# Patient Record
Sex: FEMALE | Race: WHITE | HISPANIC OR LATINO | Employment: OTHER | URBAN - METROPOLITAN AREA
[De-identification: names, ages, dates, MRNs, and addresses within clinical notes are randomized per-mention and may not be internally consistent; named-entity substitution may affect disease eponyms.]

---

## 2024-04-05 ENCOUNTER — OFFICE VISIT (OUTPATIENT)
Dept: UROGYNECOLOGY | Facility: CLINIC | Age: 74
End: 2024-04-05
Payer: MEDICARE

## 2024-04-05 VITALS — SYSTOLIC BLOOD PRESSURE: 127 MMHG | DIASTOLIC BLOOD PRESSURE: 59 MMHG | WEIGHT: 173.06 LBS

## 2024-04-05 DIAGNOSIS — N95.2 VAGINAL ATROPHY: ICD-10-CM

## 2024-04-05 DIAGNOSIS — N39.46 URINARY INCONTINENCE, MIXED: Primary | ICD-10-CM

## 2024-04-05 DIAGNOSIS — N81.4 UTEROVAGINAL PROLAPSE: ICD-10-CM

## 2024-04-05 DIAGNOSIS — K59.09 CHRONIC CONSTIPATION: ICD-10-CM

## 2024-04-05 DIAGNOSIS — N81.11 CYSTOCELE, MIDLINE: ICD-10-CM

## 2024-04-05 DIAGNOSIS — N81.6 RECTOCELE, FEMALE: ICD-10-CM

## 2024-04-05 PROCEDURE — 3074F SYST BP LT 130 MM HG: CPT | Mod: CPTII,S$GLB,, | Performed by: OBSTETRICS & GYNECOLOGY

## 2024-04-05 PROCEDURE — 99205 OFFICE O/P NEW HI 60 MIN: CPT | Mod: 25,S$GLB,, | Performed by: OBSTETRICS & GYNECOLOGY

## 2024-04-05 PROCEDURE — 1159F MED LIST DOCD IN RCRD: CPT | Mod: CPTII,S$GLB,, | Performed by: OBSTETRICS & GYNECOLOGY

## 2024-04-05 PROCEDURE — 87077 CULTURE AEROBIC IDENTIFY: CPT | Performed by: OBSTETRICS & GYNECOLOGY

## 2024-04-05 PROCEDURE — 51701 INSERT BLADDER CATHETER: CPT | Mod: S$GLB,,, | Performed by: OBSTETRICS & GYNECOLOGY

## 2024-04-05 PROCEDURE — 4010F ACE/ARB THERAPY RXD/TAKEN: CPT | Mod: CPTII,S$GLB,, | Performed by: OBSTETRICS & GYNECOLOGY

## 2024-04-05 PROCEDURE — 1160F RVW MEDS BY RX/DR IN RCRD: CPT | Mod: CPTII,S$GLB,, | Performed by: OBSTETRICS & GYNECOLOGY

## 2024-04-05 PROCEDURE — 87086 URINE CULTURE/COLONY COUNT: CPT | Performed by: OBSTETRICS & GYNECOLOGY

## 2024-04-05 PROCEDURE — 1126F AMNT PAIN NOTED NONE PRSNT: CPT | Mod: CPTII,S$GLB,, | Performed by: OBSTETRICS & GYNECOLOGY

## 2024-04-05 PROCEDURE — 87088 URINE BACTERIA CULTURE: CPT | Performed by: OBSTETRICS & GYNECOLOGY

## 2024-04-05 PROCEDURE — 99999 PR PBB SHADOW E&M-NEW PATIENT-LVL IV: CPT | Mod: PBBFAC,,, | Performed by: OBSTETRICS & GYNECOLOGY

## 2024-04-05 PROCEDURE — 87186 SC STD MICRODIL/AGAR DIL: CPT | Performed by: OBSTETRICS & GYNECOLOGY

## 2024-04-05 PROCEDURE — 3078F DIAST BP <80 MM HG: CPT | Mod: CPTII,S$GLB,, | Performed by: OBSTETRICS & GYNECOLOGY

## 2024-04-05 RX ORDER — HYDROCORTISONE 25 MG/G
CREAM TOPICAL
COMMUNITY
Start: 2023-10-26

## 2024-04-05 RX ORDER — LISINOPRIL 5 MG/1
TABLET ORAL
COMMUNITY
Start: 2023-10-11

## 2024-04-05 RX ORDER — METFORMIN HYDROCHLORIDE 500 MG/1
TABLET ORAL
COMMUNITY

## 2024-04-05 RX ORDER — ERGOCALCIFEROL 1.25 MG/1
CAPSULE ORAL
COMMUNITY

## 2024-04-05 RX ORDER — BEMPEDOIC ACID AND EZETIMIBE 180; 10 MG/1; MG/1
TABLET, FILM COATED ORAL
COMMUNITY

## 2024-04-05 RX ORDER — FLUOXETINE HYDROCHLORIDE 20 MG/1
CAPSULE ORAL
COMMUNITY

## 2024-04-05 RX ORDER — CLOTRIMAZOLE AND BETAMETHASONE DIPROPIONATE 10; .64 MG/G; MG/G
CREAM TOPICAL
COMMUNITY

## 2024-04-05 RX ORDER — ESOMEPRAZOLE MAGNESIUM 40 MG/1
CAPSULE, DELAYED RELEASE ORAL
COMMUNITY

## 2024-04-05 RX ORDER — DEXLANSOPRAZOLE 60 MG/1
CAPSULE, DELAYED RELEASE ORAL
COMMUNITY

## 2024-04-05 RX ORDER — DOCUSATE SODIUM 100 MG/1
100 CAPSULE, LIQUID FILLED ORAL 2 TIMES DAILY PRN
COMMUNITY
Start: 2024-04-04 | End: 2024-04-18

## 2024-04-05 RX ORDER — AMOXICILLIN AND CLAVULANATE POTASSIUM 875; 125 MG/1; MG/1
TABLET, FILM COATED ORAL
COMMUNITY

## 2024-04-05 RX ORDER — LANCETS 28 GAUGE
EACH MISCELLANEOUS
COMMUNITY

## 2024-04-05 RX ORDER — IBUPROFEN 600 MG/1
600 TABLET ORAL EVERY 6 HOURS PRN
COMMUNITY
Start: 2024-04-04 | End: 2024-04-19

## 2024-04-05 RX ORDER — EZETIMIBE 10 MG/1
TABLET ORAL
COMMUNITY
Start: 2024-03-06

## 2024-04-05 RX ORDER — HYDROCODONE BITARTRATE AND ACETAMINOPHEN 5; 325 MG/1; MG/1
1 TABLET ORAL EVERY 6 HOURS PRN
COMMUNITY
Start: 2024-04-04 | End: 2024-04-09

## 2024-04-05 RX ORDER — NARATRIPTAN 2.5 MG/1
TABLET ORAL
COMMUNITY

## 2024-04-05 RX ORDER — CELECOXIB 200 MG/1
CAPSULE ORAL
COMMUNITY

## 2024-04-05 RX ORDER — ECONAZOLE NITRATE 10 MG/G
CREAM TOPICAL
COMMUNITY
Start: 2023-11-29

## 2024-04-05 RX ORDER — FAMOTIDINE 20 MG/1
TABLET, FILM COATED ORAL
COMMUNITY

## 2024-04-05 RX ORDER — MECLIZINE HCL 12.5 MG 12.5 MG/1
TABLET ORAL
COMMUNITY

## 2024-04-05 RX ORDER — DUTASTERIDE 0.5 MG/1
CAPSULE, LIQUID FILLED ORAL
COMMUNITY

## 2024-04-05 RX ORDER — LEVOTHYROXINE SODIUM 75 UG/1
TABLET ORAL
COMMUNITY

## 2024-04-05 RX ORDER — FLUTICASONE PROPIONATE 50 MCG
SPRAY, SUSPENSION (ML) NASAL
COMMUNITY
Start: 2024-01-02

## 2024-04-05 RX ORDER — GABAPENTIN 100 MG/1
CAPSULE ORAL
COMMUNITY

## 2024-04-05 NOTE — PROGRESS NOTES
[facility] - UROGYNECOLOGY  [address] [address]    Elizabeth Felipe  [id number]  1950 April 7, 2024    Consulting Physician: Self, Garyreftico   GYN: Dr. Courtney  Primary MCATERINA.: Yohannes Swift MD    Chief Complaint   Patient presents with    Vaginal Prolapse     Recent hysteroscope 4/4/2024       HPI:     1)  UI:  (+) KEN < (+) UUI  X years.  (+) pads:1/day, usually minimum wetness and 1/night usually minimum wetness unless has UUI on way to BR.  Daytime frequency: Q 1 hours.  Nocturia: Yes: 3/night.   (--) dysuria,  (--) hematuria--was told has blood in urine sometimes,  (--) frequent UTIs.  (--) complete bladder emptying. Has to strain.   --cysto 4/4/2023: normal    2)  POP:  Present x year. worsening. below introitus. Saw Dr. Perry 2023. ASC vs colpocleisis was discussed. Tried pessary Fall 2023--comfortable.  Was set up with Q2-3 months pessary checks.  Couldn't remove independently. Saw Orlando 1/2024 for pessary cleaning. Became uncomfortable + bleeding after pessary was removed 2 weeks ago. Symptoms:(+)  pressure, discomfort.  (+) vaginal bleeding. (--) vaginal discharge. (--) sexually active--done with this. Partner has some health issues.  (--) h/o dyspareunia.  (+)  Vaginal dryness. Using aquaphor PRN.   (--) vaginal estrogen use.     3)  BM:  (+) constipation/straining. Takes linzess, colace, scoop of benefiber in coffee. Stool consistency on this regimen is normal.   (--) chronic diarrhea. (--) hematochezia.  (--) fecal incontinence.  (--) fecal smearing/urgency.  (--) complete evacuation.  Has to double defecate. Sometimes perianally splints. No rectal prolapse.     4)   bleeding:  --started around Dec 2023  --colonoscopy 2022, polyps per report; has GI appt (Suha Barlow in June 2024)  --11/2023 pap normal; no HPV  --had Dx LSC + H/S, D&C 4-4-2024 with Orlando:  Findings: Normal external female genitalia, patulous large cervix with stage III incomplete uterovaginal  prolapse, anterior vaginal wall defect in posterior vaginal wall defect, atrophic vaginal tissue without any lesions or ulcerates to the vaginal epithelium to suggest bleeding was from the vagina, intra-abdominal survey revealed lower pelvis without any significant adhesive disease noted, uterus was small with a posterior pedunculated fibroid small subserosal fibroid, small right ovary, unable to visualize tube on right or left side, left ovary appeared to be slightly adhesed some epiploic on the left colon, right upper quadrant was completely obliterated by omental adhesions, umbilical entry site was without any notable adhesions, left upper quadrant had omental adhesions extended beyond the floxiform ligament. Hysteroscopic findings uterus sounded to approximately 7 cm, long lower uterine and cervical segment, cervical polyp noted the just proximal to the endocervical canal, intrauterine cavity with significant adhesions like tissue unable to visualize the ostia bilaterally, fluid deficit measured 200 by the machine but there was spillage on the floor the probably accounts for at least 100 so I would calculate deficit less than 100 ml. Cystoscopic findings, bladder urothelium with chronic cystitis, with cystitis cystica, no abnormal vascular patterns, no masses/lesions in urothelial noted, no diverticula, brisk efflux of clear urine seen from bilateral ureteral orifices, large floppy bladder with the interureteric ridge right at the bladder neck. Urethra normal without any lesions. Tenaculum site hemostatic that did require cautery.   --endometrial curettings + polyp sent for path: pending    Past Medical History  Past Medical History:   Diagnosis Date    Diabetes mellitus     Hypertension    --breast CA: s/p L lumpectomy 2022 (E2 sens); s/p rad Tx; declined E2 blocker Tx; followed by Dr. Schmidt at  (oncology)  --DM2: metformin; sometimes checks BG; reports A1c 5+%; +neuropathy from spinal stenosis; no  secondary disease  --spinal stenosis: sciatica; takes gabapentin; can walk fine  --GERD  --hypothyroidism  --HA: takes excederin PRN; no amerge   --Fuchs heterochondria     2024 TTE:   The left ventricular ejection fraction is normal.   Ejection Fraction = 60-65%.   Grade I diastolic dysfunction, (abnormal relaxation pattern).   The right ventricular systolic function is normal.   Mild aortic regurgitation.   There is mild tricuspid regurgitation.   The inferior vena cava is normal in size with normal collapsibility     2024 CT angio coronary: IMPRESSION:   CORONARY CALCIUM SCORE 0.   MILD MYOCARDIAL BRIDGING OF THE LAD. NO SIGNIFICANT STENOSIS. NO PLAQUE FORMATION.     Past Surgical History  Past Surgical History:   Procedure Laterality Date    BREAST SURGERY Left     HYSTEROSCOPY      ROTATOR CUFF REPAIR Left    --L lumpectomy   --RUQ luisa  --LSC with LLQ hand port? sigmoidectomy for diverticulitis  --endometrial ablation    Hysterectomy: No    Past Ob History     x 2.  C/s x 0.    Largest infant weight: 9#7oz  unknown FAVD. yes episiotomy.      Gynecologic History  LMP: No LMP recorded. Patient is postmenopausal.  Age of menarche: 15 yo  Age of menopause: unknown due to ablation in 30s  Menstrual history: h/o menorrhagia  Pap test: 2023 normal/no HPV reflexed.  History of abnormal paps: No.  History of STIs:  No  Mammogram: Date of last: 2023.  Result: Normal  Colonoscopy: Date of last:  ([facility]).  Result:  polyps.  Repeat due:  .    DEXA:  Date of last: 2023.  Result:  osteopenia, FRAX not sig elevated.  Repeat due:  per PCP. Taking Vitamin D.     Family History  Family History   Family history unknown: Yes   --doesn't know paternal history; father was adopted  Colon CA: No  Breast CA: No  GYN CA: No   CA: No    Social History  Social History     Tobacco Use   Smoking Status Never   Smokeless Tobacco Never     Social History     Substance and Sexual Activity   Alcohol Use  Not Currently   .    Social History     Substance and Sexual Activity   Drug Use Never     The patient is  x 50 years.  Resides in [place] [id number].  Employment status: retired .     Allergies  Review of patient's allergies indicates:  No Known Allergies    Medications  Current Outpatient Medications on File Prior to Visit   Medication Sig Dispense Refill    blood sugar diagnostic Strp 1 strip by Other route.      celecoxib (CELEBREX) 200 MG capsule Take 1 capsule twice a day by oral route for 90 days.      clotrimazole-betamethasone 1-0.05% (LOTRISONE) cream       dexlansoprazole (DEXILANT) 60 mg capsule       docusate sodium (COLACE) 100 MG capsule Take 100 mg by mouth 2 (two) times daily as needed.      dutasteride (AVODART) 0.5 mg capsule       econazole nitrate 1 % cream APPLY TO AFFECTED AREA(S) TWO TIMES A DAY      ergocalciferol (ERGOCALCIFEROL) 50,000 unit Cap       esomeprazole (NEXIUM) 40 MG capsule       ezetimibe (ZETIA) 10 mg tablet       famotidine (PEPCID) 20 MG tablet       fluticasone propionate (FLONASE) 50 mcg/actuation nasal spray       gabapentin (NEURONTIN) 100 MG capsule Take 1 capsule 3 times a day by oral route as needed for 90 days.      HYDROcodone-acetaminophen (NORCO) 5-325 mg per tablet Take 1 tablet by mouth every 6 (six) hours as needed.      ibuprofen (ADVIL,MOTRIN) 600 MG tablet Take 600 mg by mouth every 6 (six) hours as needed.      lancets 28 gauge Misc FreeStyle Lancets 28 gauge      levothyroxine (SYNTHROID) 75 MCG tablet       linaCLOtide (LINZESS) 145 mcg Cap capsule       lisinopriL (PRINIVIL,ZESTRIL) 5 MG tablet       meclizine (ANTIVERT) 12.5 mg tablet       metFORMIN (GLUCOPHAGE) 500 MG tablet       naratriptan (AMERGE) 2.5 MG tablet       NEXLIZET 180-10 mg Tab       PROCTO-MED HC 2.5 % rectal cream       amoxicillin-clavulanate 875-125mg (AUGMENTIN) 875-125 mg per tablet  (Patient not taking: Reported on 4/5/2024)      FLUoxetine 20 MG capsule         No current facility-administered medications on file prior to visit.       Review of Systems A 14 point ROS was reviewed with pertinent positives as noted above in the history of present illness.      Constitutional: negative  Eyes: negative  Endocrine: negative  Gastrointestinal: negative  Cardiovascular: negative  Respiratory: negative  Allergic/Immunologic: negative  Integumentary: negative  Psychiatric: negative  Musculoskeletal: negative   Ear/Nose/Throat: negative  Neurologic: negative  Genitourinary: SEE HPI  Hematologic/Lymphatic: negative   Breast: negative    Urogynecologic Exam  BP (!) 127/59 (BP Location: Right arm, Patient Position: Sitting, BP Method: Medium (Automatic))   Wt 78.5 kg (173 lb 1 oz)     GENERAL APPEARANCE:  The patient is well-developed, well-nourished.   Neck:  Supple with no thyromegaly, no carotid bruits.  Heart:  Regular rate and rhythm, no murmurs, rubs or gallops.  Lungs:  Clear.  No CVA tenderness.  Abdomen:  Soft, nontender, nondistended, no hepatosplenomegaly.  Incisions:  LLQ mini lap, RUQ luisa well-healed; recent LSC well-healing    PELVIC:    External genitalia:  Normal Bartholins, Skenes and labia bilaterally.    Urethra:  No caruncle, diverticulum or masses.  (+) hypermobility.    Vagina:  Atrophy (+) , no bladder masses or tender, no discharge.  No obvious abrasions.   Cervix:  normal appearance  Uterus: normal size, contour, position, consistency, mobility, non-tender  Adnexa: Not palpable.    POP-Q:  Aa +2; Ba +2; C +2; Ap 0; Bp 0; D -3.  Genital hiatus 4, perineal body 3, total vaginal length 10 (poor Valsalva effort-moves more with cough, seems like may be close to UV POP if cervix on traction).    NEUROLOGIC:  Cranial nerves 2 through 12 intact.  Strength 5/5.  DTRs 2+ lower extremities.  S2 through 4 normal.  Sacral reflexes intact.    EXT: CHEATHAM, 2+ pulses bilaterally, no C/C/E    COUGH STRESS TEST:  negative  KEGEL: unable to perform    RECTAL:    External:   Normal, (--) hemorrhoids, (--) dovetailing.   Internal:   (--) tenderness, (--) masses, Normal resting tone, Normal active tone. Grossly heme NEG.     PVR: 30 mL    Impression    1. Urinary incontinence, mixed    2. Uterovaginal prolapse    3. Cystocele, midline    4. Rectocele, female    5. Vaginal atrophy    6. Chronic constipation        Initial Plan  The patient was counseled regarding these issues. The patient was given a summary sheet containing each of these issues with possible options for evaluation and management. When appropriate, we also reviewed computer-generated diagrams specific to their diagnoses..  All questions were addressed to the patient's satisfaction.    1) Stage 3-4 uterovaginal prolapse:  --discussed  --options: continue pessary use vs surgery   --if continue pessary use, use lubricants as below on regular basis      --if surgery: consider LeFort colpocleisis (closing vagina around uterus) if pathology from recent H/S D&C NEG; no longer concerned with intercourse    --pap 2023 normal; no h/o abnormal paps   --if surgery: if wants to maintain ability to have intercourse, would do robotic-hysterectomy + removal of tubes/ovaries + sacral colpopexy   --if surgery:  bladder testing (urodynamics) to see if needs sling for stress incontinence    --recent cysto 4-4-2024 with Orlando pendleton   --if surgery: need to complete evaluation to r/o GI contribution to bleeding--has GI appt June; rectal exam grossly NEG for blood today   --if surgery: follow up pathology from 4-4-2024 OR   --if surgery: clearance per PCP Deborah) + labs (A1c, TSH, CBC, CMP, T&S)/EKG    --had recent TTE/CT angio CA++, ok    2)  Chronic constipation:  --GI consult 6/2023  --continue to avoid straining with BMs  --continue linzess, colace, scoop of benefiber--take with large glass of water, not coffee  --how to slowly increase fiber:  Take 1 tsp of fiber powder (psyllium or other sugar-free powder).  Mix in 8 oz of water.  Take x  3-5 days.  Then, increase fiber by 1 tsp every 3-5 days until stool is easy to pass.  Stop and continue at that dose.   Do not exceed 6 tsps/day.  May also use over the counter stool softener 1-2 x/day.  AVOID laxatives.  --hydrate well  --consider pelvic floor PT to work on not straining as much with bowel movements    3)  Vaginal atrophy (dryness):    --use Non-estrogen options for vaginal dryness--will help pessary not rub:  --Use REPLENS or REFRESH OTC: 1/2 applicator full in vagina twice a week.    --coconut oil: dime-sized amount with finger (as far as can reach internally) and around the vaginal opening and inner lips at least 3x/week    4)  Mixed urinary incontinence, urge > stress:    --urine C&S  --Empty bladder every 3 hours.  Empty well: wait a minute, lean forward on toilet.    --Avoid dietary irritants (see sheet).  Keep diary x 3-5 days to determine your irritants.  --KEGELS: do 10 in AM and 10 in PM, holding each x 10 seconds.  When you feel urge to go, STOP, KEGEL, and when urge has passed, then go to bathroom.  Consider PT in future.    --URGE: consider medication in future. Takes 2-4 weeks to see if will have effect.  For dry mouth: get sour, sugar free lozenge or gum.    --STRESS:  Pessary vs. Sling.     5)  RTC 3-4 for pessary check with NP or PA.     Approximately 90 min were spent in consult, 90 % in discussion.   The patient's daughter was present for the entire visit.   Thank you for requesting consultation of your patient.  I look forward to participating in their care.    Zack Arellano  Female Pelvic Medicine and Reconstructive Surgery  [facility] [place], [place]

## 2024-04-05 NOTE — PATIENT INSTRUCTIONS
1) Stage 3-4 uterovaginal prolapse:  --discussed  --options: continue pessary use vs surgery   --if continue pessary use, use lubricants as below on regular basis      --if surgery: consider LeFort colpocleisis (closing vagina around uterus) if pathology from recent H/S D&C NEG; no longer concerned with intercourse    --pap 2023 normal; no h/o abnormal paps   --if surgery: if wants to maintain ability to have intercourse, would do robotic-hysterectomy + removal of tubes/ovaries + sacral colpopexy   --if surgery:  bladder testing (urodynamics) to see if needs sling for stress incontinence    --recent cysto 4-4-2024 with Orlando pendleton   --if surgery: need to complete evaluation to r/o GI contribution to bleeding--has GI appt June; rectal exam grossly NEG for blood today   --if surgery: follow up pathology from 4-4-2024 OR   --if surgery: clearance per PCP + labs (A1c, TSH, CBC, CMP, T&S)/EKG    --had recent TTE/CT angio CA++, ok    2)  Chronic constipation:  --GI consult 6/2023  --continue to avoid straining with BMs  --continue linzess, colace, scoop of benefiber--take with large glass of water, not coffee  --hydrate well    3)  Vaginal atrophy (dryness):    --use Non-estrogen options for vaginal dryness--will help pessary not rub:  --Use REPLENS or REFRESH OTC: 1/2 applicator full in vagina twice a week.    --coconut oil: dime-sized amount with finger (as far as can reach internally) and around the vaginal opening and inner lips at least 3x/week    4)  Mixed urinary incontinence, urge > stress:    --urine C&S  --Empty bladder every 3 hours.  Empty well: wait a minute, lean forward on toilet.    --Avoid dietary irritants (see sheet).  Keep diary x 3-5 days to determine your irritants.  --KEGELS: do 10 in AM and 10 in PM, holding each x 10 seconds.  When you feel urge to go, STOP, KEGEL, and when urge has passed, then go to bathroom.  Consider PT in future.    --URGE: consider medication in future. Takes 2-4 weeks to see  if will have effect.  For dry mouth: get sour, sugar free lozenge or gum.    --STRESS:  Pessary vs. Sling.     5)  RTC 3-4 for pessary check with NP or PA.

## 2024-04-07 PROBLEM — N81.6 RECTOCELE, FEMALE: Status: ACTIVE | Noted: 2024-04-07

## 2024-04-07 PROBLEM — N81.11 CYSTOCELE, MIDLINE: Status: ACTIVE | Noted: 2024-04-07

## 2024-04-07 PROBLEM — K59.09 CHRONIC CONSTIPATION: Status: ACTIVE | Noted: 2024-04-07

## 2024-04-07 PROBLEM — N81.4 UTEROVAGINAL PROLAPSE: Status: ACTIVE | Noted: 2024-04-07

## 2024-04-07 PROBLEM — N39.46 URINARY INCONTINENCE, MIXED: Status: ACTIVE | Noted: 2024-04-07

## 2024-04-07 PROBLEM — N95.2 VAGINAL ATROPHY: Status: ACTIVE | Noted: 2024-04-07

## 2024-04-11 ENCOUNTER — PATIENT MESSAGE (OUTPATIENT)
Dept: UROGYNECOLOGY | Facility: CLINIC | Age: 74
End: 2024-04-11
Payer: MEDICARE

## 2024-04-11 DIAGNOSIS — N39.0 ACUTE UTI: Primary | ICD-10-CM

## 2024-04-11 DIAGNOSIS — R19.8 STRAINING WITH STOOLS: Primary | ICD-10-CM

## 2024-04-11 DIAGNOSIS — N39.46 URINARY INCONTINENCE, MIXED: ICD-10-CM

## 2024-04-11 LAB — BACTERIA UR CULT: ABNORMAL

## 2024-04-11 RX ORDER — CIPROFLOXACIN 500 MG/1
500 TABLET ORAL 2 TIMES DAILY
Qty: 6 TABLET | Refills: 0 | Status: SHIPPED | OUTPATIENT
Start: 2024-04-11 | End: 2024-04-14

## 2024-04-12 DIAGNOSIS — N39.46 URINARY INCONTINENCE, MIXED: Primary | ICD-10-CM

## 2024-04-12 DIAGNOSIS — R19.8 STRAINING WITH STOOLS: ICD-10-CM

## 2024-04-25 ENCOUNTER — TELEPHONE (OUTPATIENT)
Dept: UROGYNECOLOGY | Facility: CLINIC | Age: 74
End: 2024-04-25
Payer: MEDICARE

## 2024-04-25 NOTE — TELEPHONE ENCOUNTER
Spoke with patient. She states Dr. Perry told her pathology was benign.  UNable to see copy. Advised to get copy and upload into [facility] if possible.    ----- Message from Zack Arellano MD sent at 4/7/2024  6:02 PM CDT -----  Regarding: follow up D&C path from [facility] 4-4

## 2024-04-26 NOTE — TELEPHONE ENCOUNTER
Pathology from [facility] [place] 4/[place]24 was benign. Report attached to patient message from 4/25/2024.

## 2024-05-15 ENCOUNTER — OFFICE VISIT (OUTPATIENT)
Dept: UROGYNECOLOGY | Facility: CLINIC | Age: 74
End: 2024-05-15
Payer: MEDICARE

## 2024-05-15 DIAGNOSIS — N81.6 RECTOCELE, FEMALE: ICD-10-CM

## 2024-05-15 DIAGNOSIS — K59.09 CHRONIC CONSTIPATION: ICD-10-CM

## 2024-05-15 DIAGNOSIS — N81.11 CYSTOCELE, MIDLINE: ICD-10-CM

## 2024-05-15 DIAGNOSIS — N81.4 UTEROVAGINAL PROLAPSE: ICD-10-CM

## 2024-05-15 DIAGNOSIS — N95.2 VAGINAL ATROPHY: ICD-10-CM

## 2024-05-15 DIAGNOSIS — N39.46 URINARY INCONTINENCE, MIXED: Primary | ICD-10-CM

## 2024-05-15 DIAGNOSIS — Z46.89 PESSARY MAINTENANCE: ICD-10-CM

## 2024-05-15 PROCEDURE — 1159F MED LIST DOCD IN RCRD: CPT | Mod: CPTII,S$GLB,, | Performed by: NURSE PRACTITIONER

## 2024-05-15 PROCEDURE — 1160F RVW MEDS BY RX/DR IN RCRD: CPT | Mod: CPTII,S$GLB,, | Performed by: NURSE PRACTITIONER

## 2024-05-15 PROCEDURE — 4010F ACE/ARB THERAPY RXD/TAKEN: CPT | Mod: CPTII,S$GLB,, | Performed by: NURSE PRACTITIONER

## 2024-05-15 PROCEDURE — 99999 PR PBB SHADOW E&M-EST. PATIENT-LVL III: CPT | Mod: PBBFAC,,, | Performed by: NURSE PRACTITIONER

## 2024-05-15 PROCEDURE — 99213 OFFICE O/P EST LOW 20 MIN: CPT | Mod: S$GLB,,, | Performed by: NURSE PRACTITIONER

## 2024-05-15 PROCEDURE — 3288F FALL RISK ASSESSMENT DOCD: CPT | Mod: CPTII,S$GLB,, | Performed by: NURSE PRACTITIONER

## 2024-05-15 PROCEDURE — 1101F PT FALLS ASSESS-DOCD LE1/YR: CPT | Mod: CPTII,S$GLB,, | Performed by: NURSE PRACTITIONER

## 2024-05-15 NOTE — PROGRESS NOTES
Urogyn follow up  05/15/2024  .  [facility] [place]  [address]  [facility] [id number]    Elizabeth Felipe  [id number]  1950      Elizabeth Felipe is a 74 y.o. here for a urogyn follow up for pessary check/.    Last HPI from 04/05/2024  1)  UI:  (+) KEN < (+) UUI  X years.  (+) pads:1/day, usually minimum wetness and 1/night usually minimum wetness unless has UUI on way to BR.  Daytime frequency: Q 1 hours.  Nocturia: Yes: 3/night.   (--) dysuria,  (--) hematuria--was told has blood in urine sometimes,  (--) frequent UTIs.  (--) complete bladder emptying. Has to strain.   --cysto 4/4/2023: normal     2)  POP:  Present x year. worsening. below introitus. Saw Dr. Perry 2023. ASC vs colpocleisis was discussed. Tried pessary Fall 2023--comfortable.  Was set up with Q2-3 months pessary checks.  Couldn't remove independently. Saw Orlando 1/2024 for pessary cleaning. Became uncomfortable + bleeding after pessary was removed 2 weeks ago. Symptoms:(+)  pressure, discomfort.  (+) vaginal bleeding. (--) vaginal discharge. (--) sexually active--done with this. Partner has some health issues.  (--) h/o dyspareunia.  (+)  Vaginal dryness. Using aquaphor PRN.   (--) vaginal estrogen use.   POP-Q:  Aa +2; Ba +2; C +2; Ap 0; Bp 0; D -3.  Genital hiatus 4, perineal body 3, total vaginal length 10 (poor Valsalva effort-moves more with cough, seems like may be close to UV POP if cervix on traction).   Pvr 30 mL     3)  BM:  (+) constipation/straining. Takes linzess, colace, scoop of benefiber in coffee. Stool consistency on this regimen is normal.   (--) chronic diarrhea. (--) hematochezia.  (--) fecal incontinence.  (--) fecal smearing/urgency.  (--) complete evacuation.  Has to double defecate. Sometimes perianally splints. No rectal prolapse.      4)   bleeding:  --started around Dec 2023  --colonoscopy 2022, polyps per report; has GI appt (Suha Barlow in June 2024)  --11/2023 pap normal; no HPV  --had Dx LSC + H/S,  D&C 4-4-2024 with Orlando:  Findings: Normal external female genitalia, patulous large cervix with stage III incomplete uterovaginal prolapse, anterior vaginal wall defect in posterior vaginal wall defect, atrophic vaginal tissue without any lesions or ulcerates to the vaginal epithelium to suggest bleeding was from the vagina, intra-abdominal survey revealed lower pelvis without any significant adhesive disease noted, uterus was small with a posterior pedunculated fibroid small subserosal fibroid, small right ovary, unable to visualize tube on right or left side, left ovary appeared to be slightly adhesed some epiploic on the left colon, right upper quadrant was completely obliterated by omental adhesions, umbilical entry site was without any notable adhesions, left upper quadrant had omental adhesions extended beyond the floxiform ligament. Hysteroscopic findings uterus sounded to approximately 7 cm, long lower uterine and cervical segment, cervical polyp noted the just proximal to the endocervical canal, intrauterine cavity with significant adhesions like tissue unable to visualize the ostia bilaterally, fluid deficit measured 200 by the machine but there was spillage on the floor the probably accounts for at least 100 so I would calculate deficit less than 100 ml. Cystoscopic findings, bladder urothelium with chronic cystitis, with cystitis cystica, no abnormal vascular patterns, no masses/lesions in urothelial noted, no diverticula, brisk efflux of clear urine seen from bilateral ureteral orifices, large floppy bladder with the interureteric ridge right at the bladder neck. Urethra normal without any lesions. Tenaculum site hemostatic that did require cautery.   --endometrial curettings + polyp sent for path: pending       Changes from last visit:  1) Stage 3-4 uterovaginal prolapse:  --using GH pessary     2)  Chronic constipation:  --taking linzess occasionally  --using fiber supplement daily  --going to  pelvic floor PT   --BM has improved with pessary in place     3)  Vaginal atrophy (dryness):    --not using vaginal lubrication     4)  Mixed urinary incontinence, urge > stress:    --+KEN/UUI  --using 1/ pad/ day with minimal wetness  --voiding 1 1/2-2 hours  --nocturia 3-5/ night--limits fluids prior to bedtime.      Past Medical History:   Diagnosis Date    Diabetes mellitus     Hypertension    --breast CA: s/p L lumpectomy 2022 (E2 sens); s/p rad Tx; declined E2 blocker Tx; followed by Dr. Schmidt at  (oncology)  --DM2: metformin; sometimes checks BG; reports A1c 5+%; +neuropathy from spinal stenosis; no secondary disease  --spinal stenosis: sciatica; takes gabapentin; can walk fine  --GERD  --hypothyroidism  --HA: takes excederin PRN; no amerge   --Fuchs heterochondria      2/2024 TTE:   The left ventricular ejection fraction is normal.   Ejection Fraction = 60-65%.   Grade I diastolic dysfunction, (abnormal relaxation pattern).   The right ventricular systolic function is normal.   Mild aortic regurgitation.   There is mild tricuspid regurgitation.   The inferior vena cava is normal in size with normal collapsibility      2/2024 CT angio coronary: IMPRESSION:   CORONARY CALCIUM SCORE 0.   MILD MYOCARDIAL BRIDGING OF THE LAD. NO SIGNIFICANT STENOSIS. NO PLAQUE FORMATION.        Past Surgical History:   Procedure Laterality Date    BREAST SURGERY Left 2021    HYSTEROSCOPY      ROTATOR CUFF REPAIR Left        Family History   Family history unknown: Yes       Social History     Socioeconomic History    Marital status:    Tobacco Use    Smoking status: Never    Smokeless tobacco: Never   Substance and Sexual Activity    Alcohol use: Not Currently    Drug use: Never    Sexual activity: Not Currently     Social Determinants of Health     Financial Resource Strain: Low Risk  (5/14/2024)    Overall Financial Resource Strain (CARDIA)     Difficulty of Paying Living Expenses: Not hard at all   Food Insecurity:  No Food Insecurity (5/14/2024)    Hunger Vital Sign     Worried About Running Out of Food in the Last Year: Never true     Ran Out of Food in the Last Year: Never true   Transportation Needs: No Transportation Needs (5/14/2024)    PRAPARE - Transportation     Lack of Transportation (Medical): No     Lack of Transportation (Non-Medical): No   Physical Activity: Unknown (5/14/2024)    Exercise Vital Sign     Days of Exercise per Week: 0 days   Recent Concern: Physical Activity - Inactive (4/2/2024)    Received from [facility], [facility]    Exercise Vital Sign     Days of Exercise per Week: 0 days     Minutes of Exercise per Session: 0 min   Stress: Stress Concern Present (5/14/2024)    [employer] - Occupational Stress Questionnaire     Feeling of Stress : To some extent   Housing Stability: Unknown (5/14/2024)    Housing Stability Vital Sign     Unable to Pay for Housing in the Last Year: No       Current Outpatient Medications   Medication Sig Dispense Refill    blood sugar diagnostic Strp 1 strip by Other route.      celecoxib (CELEBREX) 200 MG capsule Take 1 capsule twice a day by oral route for 90 days.      clotrimazole-betamethasone 1-0.05% (LOTRISONE) cream       dutasteride (AVODART) 0.5 mg capsule       ezetimibe (ZETIA) 10 mg tablet       famotidine (PEPCID) 20 MG tablet       fluticasone propionate (FLONASE) 50 mcg/actuation nasal spray       gabapentin (NEURONTIN) 100 MG capsule Take 1 capsule 3 times a day by oral route as needed for 90 days.      lancets 28 gauge Misc FreeStyle Lancets 28 gauge      levothyroxine (SYNTHROID) 75 MCG tablet       linaCLOtide (LINZESS) 145 mcg Cap capsule       lisinopriL (PRINIVIL,ZESTRIL) 5 MG tablet       meclizine (ANTIVERT) 12.5 mg tablet       metFORMIN (GLUCOPHAGE) 500 MG tablet       naratriptan (AMERGE) 2.5 MG tablet       NEXLIZET 180-10 mg Tab       amoxicillin-clavulanate 875-125mg (AUGMENTIN) 875-125 mg per tablet  (Patient not  taking: Reported on 4/5/2024)      dexlansoprazole (DEXILANT) 60 mg capsule       econazole nitrate 1 % cream APPLY TO AFFECTED AREA(S) TWO TIMES A DAY      ergocalciferol (ERGOCALCIFEROL) 50,000 unit Cap       esomeprazole (NEXIUM) 40 MG capsule       FLUoxetine 20 MG capsule       PROCTO-MED HC 2.5 % rectal cream        No current facility-administered medications for this visit.       Review of patient's allergies indicates:  No Known Allergies    Well woman:  Pap test: 11/2023 normal/no HPV reflexed.  History of abnormal paps: No.  History of STIs:  No  Mammogram: Date of last: 9/2023.  Result: Normal  Colonoscopy: Date of last: 2022 ([facility]).  Result:  polyps.  Repeat due:  2027.    DEXA:  Date of last: 6/2023.  Result:  osteopenia, FRAX not sig elevated.  Repeat due:  per PCP. Taking Vitamin D.     ROS:  As per HPI.      Exam  There were no vitals taken for this visit.  General: alert and oriented, no acute distress  Respiratory: normal respiratory effort  Abd: soft, non-tender, non-distended    Pelvic  Ext. Genitalia: normal external genitalia. Normal bartholin's and skeens glands  Vagina: + atrophy. Normal vaginal mucosa without lesions. No discharge noted.   Non-tender bladder base without palpable mass.  GH  LS pessary in place  Cervix: no lesions  Uterus: normal size, shape, position, mobile, nontender  Urethra: no masses or tenderness  Urethral meatus: no lesions, caruncle or prolapse.    Pessary removed without difficulty. Washed with soap and water. Reinserted without diffiuclty    Impression  1. Urinary incontinence, mixed        2. Uterovaginal prolapse        3. Cystocele, midline        4. Rectocele, female        5. Vaginal atrophy        6. Chronic constipation        7. Pessary maintenance          We reviewed the above issues and discussed options for short-term versus long-term management of her problems.   Plan:   1) Stage 3-4 uterovaginal prolapse:  --continue GH pessary  --options:  continue pessary use vs surgery              --if continue pessary use, use lubricants as below on regular basis                            --if surgery: consider LeFort colpocleisis (closing vagina around uterus) if pathology from recent H/S D&C NEG; no longer concerned with intercourse                          --pap 2023 normal; no h/o abnormal paps              --if surgery: if wants to maintain ability to have intercourse, would do robotic-hysterectomy + removal of tubes/ovaries + sacral colpopexy              --if surgery:  bladder testing (urodynamics) to see if needs sling for stress incontinence                          --recent cysto 4-4-2024 with Orlando pendleton              --if surgery: need to complete evaluation to r/o GI contribution to bleeding--has GI appt June; rectal exam grossly NEG for blood today              --if surgery: follow up pathology from 4-4-2024 OR              --if surgery: clearance per PCP (Jamison) + labs (A1c, TSH, CBC, CMP, T&S)/EKG                          --had recent TTE/CT angio CA++, ok     2)  Chronic constipation:  --GI consult 6/2023  --continue to avoid straining with BMs  --continue linzess, colace, scoop of benefiber--take with large glass of water, not coffee  --how to slowly increase fiber:  Take 1 tsp of fiber powder (psyllium or other sugar-free powder).  Mix in 8 oz of water.  Take x 3-5 days.  Then, increase fiber by 1 tsp every 3-5 days until stool is easy to pass.  Stop and continue at that dose.   Do not exceed 6 tsps/day.  May also use over the counter stool softener 1-2 x/day.  AVOID laxatives.  --hydrate well  --consider pelvic floor PT to work on not straining as much with bowel movements     3)  Vaginal atrophy (dryness):    --use Non-estrogen options for vaginal dryness--will help pessary not rub:  --Use REPLENS or REFRESH OTC: 1/2 applicator full in vagina twice a week.    --coconut oil: dime-sized amount with finger (as far as can reach internally) and  around the vaginal opening and inner lips at least 3x/week     4)  Mixed urinary incontinence, urge > stress:    --Empty bladder every 3 hours.  Empty well: wait a minute, lean forward on toilet.    --Avoid dietary irritants (see sheet).  Keep diary x 3-5 days to determine your irritants.  --KEGELS: do 10 in AM and 10 in PM, holding each x 10 seconds.  When you feel urge to go, STOP, KEGEL, and when urge has passed, then go to bathroom.  Consider PT in future.    --URGE: consider medication in future. Takes 2-4 weeks to see if will have effect.  For dry mouth: get sour, sugar free lozenge or gum.  Does not want to try medications at this time  --STRESS:  Pessary vs. Sling.      5)  RTC 3-4 for pessary check     I spent a total of 20 minutes on the day of the visit.  This includes face to face time and non-face to face time preparing to see the patient (eg, review of tests), obtaining and/or reviewing separately obtained history, documenting clinical information in the electronic or other health record, independently interpreting results and communicating results to the patient/family/caregiver, or care coordinator.       Aleida Friedman, EMILE-BC  [facility]  Division of Female Pelvic Medicine and Reconstructive Surgery  Department of Obstetrics & Gynecology

## 2024-05-15 NOTE — PATIENT INSTRUCTIONS
1) Stage 3-4 uterovaginal prolapse:  --continue GH pessary  --options: continue pessary use vs surgery              --if continue pessary use, use lubricants as below on regular basis                            --if surgery: consider LeFort colpocleisis (closing vagina around uterus) if pathology from recent H/S D&C NEG; no longer concerned with intercourse                          --pap 2023 normal; no h/o abnormal paps              --if surgery: if wants to maintain ability to have intercourse, would do robotic-hysterectomy + removal of tubes/ovaries + sacral colpopexy              --if surgery:  bladder testing (urodynamics) to see if needs sling for stress incontinence                          --recent cysto 4-4-2024 with Orlando pendleton              --if surgery: need to complete evaluation to r/o GI contribution to bleeding--has GI appt June; rectal exam grossly NEG for blood today              --if surgery: follow up pathology from 4-4-2024 OR              --if surgery: clearance per PCP (Jamison) + labs (A1c, TSH, CBC, CMP, T&S)/EKG                          --had recent TTE/CT angio CA++, ok     2)  Chronic constipation:  --GI consult 6/2023  --continue to avoid straining with BMs  --continue linzess, colace, scoop of benefiber--take with large glass of water, not coffee  --how to slowly increase fiber:  Take 1 tsp of fiber powder (psyllium or other sugar-free powder).  Mix in 8 oz of water.  Take x 3-5 days.  Then, increase fiber by 1 tsp every 3-5 days until stool is easy to pass.  Stop and continue at that dose.   Do not exceed 6 tsps/day.  May also use over the counter stool softener 1-2 x/day.  AVOID laxatives.  --hydrate well  --consider pelvic floor PT to work on not straining as much with bowel movements     3)  Vaginal atrophy (dryness):    --use Non-estrogen options for vaginal dryness--will help pessary not rub:  --Use REPLENS or REFRESH OTC: 1/2 applicator full in vagina twice a week.    --coconut oil:  dime-sized amount with finger (as far as can reach internally) and around the vaginal opening and inner lips at least 3x/week     4)  Mixed urinary incontinence, urge > stress:    --Empty bladder every 3 hours.  Empty well: wait a minute, lean forward on toilet.    --Avoid dietary irritants (see sheet).  Keep diary x 3-5 days to determine your irritants.  --KEGELS: do 10 in AM and 10 in PM, holding each x 10 seconds.  When you feel urge to go, STOP, KEGEL, and when urge has passed, then go to bathroom.  Consider PT in future.    --URGE: consider medication in future. Takes 2-4 weeks to see if will have effect.  For dry mouth: get sour, sugar free lozenge or gum.  Does not want to try medications at this time  --STRESS:  Pessary vs. Sling.      5)  RTC 3-4 for pessary check

## 2024-06-26 NOTE — PROGRESS NOTES
"    [facility] Consultation Note    Reason for Consult:  reflux and IBS    PCP:   Yohannes Swift   No address on file    Referring MD:  Self, Aaareferral  No address on file    HPI:  This is a 74 y.o. female here for evaluation of reflux and IBS. PMHx of HTN, Breast Cancer, DM. Previously followed with A. Has a history of alternating bowel habits and reflux. Was diagnosed with IBS in the past. Currently on Omeprazole 20mg in AM and two pepcids at night. Still having reflux symptoms despite this. She has pyrosis. Denied epigastric pain, N/V, dysphagia. Has some chronic cough and hoarseness of throat. She had CCY in 20s for cholelithiasis. She has her last endoscopy reports from [facility] with her. 1/2023 cscope removed 10mm sigmoid polyp and showed left sided diverticulosis. EGD showed 6/2023 gastritis and had biopsies of stomach, SB and esophagus which she was told was normal. She also had colon resection in 2005 for recurrant diverticulitis in sigmoid.      Predominately constipated in last 6 months. Has soft consistency of stools which are formed but they are hard to pass. She strains and also has to do manual maneuvers with gloves or use a suppository. No blood in stool. Previously more issues with diarrhea. When she is having diarrhea she will have some small episodes of fecal incontinence of liquid stool. She also has urinary incontinence. Does pelvic floor PT as referral from urogyn. Wears a pad. Has a pessery. Has 2 vaginal deliveries. Denied NSAID use. No Fh of CRC, gastric cancer, celiac or IBD. She has used linzess 145mcg in the past and this does work for her. Used to work immediately and now works later in the day. Also takes benefiber in the AM one dose and a stool softener.     Objective Findings:    Vital Signs:  /73   Pulse 66   Ht 5' 5" (1.651 m)   Wt 81.3 kg (179 lb 3.7 oz)   BMI 29.83 kg/m²   Body mass index is 29.83 kg/m².    Physical Exam:  General Appearance: Well " appearing in no acute distress  Head:   Normocephalic, without obvious abnormality  Eyes:    No scleral icterus    Lungs: CTA bilaterally in anterior and posterior fields   Heart:  Regular rate and rhythm, no murmurs heard  Abdomen: Soft, non tender, non distended with positive bowel sounds in all four quadrants.      Imaging:  Reviewed     Endoscopy:    None     Assessment:    Reflux   Alternating bowel habits, predominately constipation      Patient with reflux symptoms despite PPI and H2 blocker use. EGD in 2023 showed gastritis but otherwise normal. Her heme/onc doctor had concerns her PPI was causing low WBC count and was recommended to decrease or stop this. Will repeat CBC today. Add gaviscon and see GI nutrition for reflux. Further options include EGD or EGD with bravo. Could also empirically try nortriptyline as recent EGD was unremarkable at OSH. For constipation, her stools are soft but sounds like most issue is with evacuation of stool. Will check ARM. Continue linzess, increase fiber. She is UTD on cscope   Recommendations:    - CBC, CMP   - Add gavison  - Take PPI on empty stomach, 30 mins before meals   - H2 blocker for breakthrough symptoms   - GERD precautions discussed   - see GI nutrition for reflux   - Continue linzess, increase fiber   - Refer for ARM   - Cscope in 2026 for CRC surveillance     RTC 3 months       Order summary:  Orders Placed This Encounter    CBC W/ AUTO DIFFERENTIAL    COMPREHENSIVE METABOLIC PANEL    linaCLOtide (LINZESS) 145 mcg Cap capsule         Thank you so much for allowing me to participate in the care of Elizabeth Barlow MD  Gastroenterology   [facility]

## 2024-06-27 ENCOUNTER — OFFICE VISIT (OUTPATIENT)
Dept: GASTROENTEROLOGY | Facility: CLINIC | Age: 74
End: 2024-06-27
Payer: MEDICARE

## 2024-06-27 VITALS
DIASTOLIC BLOOD PRESSURE: 73 MMHG | WEIGHT: 179.25 LBS | HEART RATE: 66 BPM | BODY MASS INDEX: 29.87 KG/M2 | HEIGHT: 65 IN | SYSTOLIC BLOOD PRESSURE: 122 MMHG

## 2024-06-27 DIAGNOSIS — K59.00 CONSTIPATION, UNSPECIFIED CONSTIPATION TYPE: Primary | ICD-10-CM

## 2024-06-27 PROCEDURE — 1125F AMNT PAIN NOTED PAIN PRSNT: CPT | Mod: CPTII,S$GLB,, | Performed by: STUDENT IN AN ORGANIZED HEALTH CARE EDUCATION/TRAINING PROGRAM

## 2024-06-27 PROCEDURE — 3288F FALL RISK ASSESSMENT DOCD: CPT | Mod: CPTII,S$GLB,, | Performed by: STUDENT IN AN ORGANIZED HEALTH CARE EDUCATION/TRAINING PROGRAM

## 2024-06-27 PROCEDURE — 3078F DIAST BP <80 MM HG: CPT | Mod: CPTII,S$GLB,, | Performed by: STUDENT IN AN ORGANIZED HEALTH CARE EDUCATION/TRAINING PROGRAM

## 2024-06-27 PROCEDURE — 1159F MED LIST DOCD IN RCRD: CPT | Mod: CPTII,S$GLB,, | Performed by: STUDENT IN AN ORGANIZED HEALTH CARE EDUCATION/TRAINING PROGRAM

## 2024-06-27 PROCEDURE — 1101F PT FALLS ASSESS-DOCD LE1/YR: CPT | Mod: CPTII,S$GLB,, | Performed by: STUDENT IN AN ORGANIZED HEALTH CARE EDUCATION/TRAINING PROGRAM

## 2024-06-27 PROCEDURE — 3074F SYST BP LT 130 MM HG: CPT | Mod: CPTII,S$GLB,, | Performed by: STUDENT IN AN ORGANIZED HEALTH CARE EDUCATION/TRAINING PROGRAM

## 2024-06-27 PROCEDURE — 99205 OFFICE O/P NEW HI 60 MIN: CPT | Mod: S$GLB,,, | Performed by: STUDENT IN AN ORGANIZED HEALTH CARE EDUCATION/TRAINING PROGRAM

## 2024-06-27 PROCEDURE — 99999 PR PBB SHADOW E&M-EST. PATIENT-LVL IV: CPT | Mod: PBBFAC,,, | Performed by: STUDENT IN AN ORGANIZED HEALTH CARE EDUCATION/TRAINING PROGRAM

## 2024-06-27 PROCEDURE — 4010F ACE/ARB THERAPY RXD/TAKEN: CPT | Mod: CPTII,S$GLB,, | Performed by: STUDENT IN AN ORGANIZED HEALTH CARE EDUCATION/TRAINING PROGRAM

## 2024-06-27 PROCEDURE — 3008F BODY MASS INDEX DOCD: CPT | Mod: CPTII,S$GLB,, | Performed by: STUDENT IN AN ORGANIZED HEALTH CARE EDUCATION/TRAINING PROGRAM

## 2024-06-27 NOTE — PATIENT INSTRUCTIONS
Gaviscon with alginate (extra strength)     Recommend to take omeprazole on an empty stomach, 45 mins before meals     Avoid eating 3-4 hours before bed     Coffee, caffeine, chocolate, peppermint and alcohol are common reflux triggers

## 2024-06-29 ENCOUNTER — PATIENT MESSAGE (OUTPATIENT)
Dept: GASTROENTEROLOGY | Facility: CLINIC | Age: 74
End: 2024-06-29
Payer: MEDICARE

## 2024-07-03 ENCOUNTER — NUTRITION (OUTPATIENT)
Dept: GASTROENTEROLOGY | Facility: CLINIC | Age: 74
End: 2024-07-03
Payer: MEDICARE

## 2024-07-03 VITALS — WEIGHT: 182.13 LBS | BODY MASS INDEX: 30.3 KG/M2

## 2024-07-03 DIAGNOSIS — K59.09 CHRONIC CONSTIPATION: Primary | ICD-10-CM

## 2024-07-03 DIAGNOSIS — K21.9 GASTROESOPHAGEAL REFLUX DISEASE, UNSPECIFIED WHETHER ESOPHAGITIS PRESENT: ICD-10-CM

## 2024-07-03 PROCEDURE — 99999 PR PBB SHADOW E&M-EST. PATIENT-LVL I: CPT | Mod: PBBFAC,,,

## 2024-07-03 NOTE — PROGRESS NOTES
"Gastroentrology  Nutrition Consult    Referring Provider: Carmen Barlow MD  Reason for Nutrition Referral: GERD with cough and hoarseness ; s/p Breast cancer treatment ( lumpectomy and radiation ) Hem-onc  has recommended elimination of PPI due to decreased WBC  Hx of sigmoid bowel resection due to diverticulitis ; Spinal stenosis /sciatica- takes gabapentin   Contributing factors- Dm on metformin ; hypothyroidism   Consultation Time: 60 Minutes     Elizabeth Felipe is a 74 y.o. female referred with the following symptoms : GERD - exploring non PPI due to Hem-onc's concern about decreased WBC count . Hx of anxiety exacerbated by 's recent health changes and son's unexpected death which has impacted management of GERD.  Noting PPI and H2 blocker currently with gaviscon added.  used to do cooking ( heavy meals with high fat and cho) ; Patient has assumed cooking responsibilities with lower fat and spice level. Triggers for Reflux more situational than food . Had been prescribed Prozac in past but decreased focus . Cymbalta maintained focus but no effect on anxiety . Needs to be alert and available at night with 's recent health issues.     A = NUTRITION ASSESSMENT   Patient Information:    Elizabeth Felipe  74 y.o.-year-old  White female    Social Data: lives with  and adult daughter; patient prepares meals and does shopping since husbands recent car accident.   .  Anthropometrics:   Relevant Wt hx: notes unintentional weight loss from 210# > 182#   since death of son     Usual Weight: 210#     Body mass index is 30.3 kg/m².  Estimated body mass index is 30.3 kg/m² as calculated from the following:    Height as of 6/27/24: 5' 5" (1.651 m).    Weight as of this encounter: 82.6 kg (182 lb 1.6 oz).  Ht Readings from Last 1 Encounters:   06/27/24 5' 5" (1.651 m)     Wt Readings from Last 12 Encounters:   07/03/24 82.6 kg (182 lb 1.6 oz)   06/27/24 81.3 kg (179 lb 3.7 oz)   04/05/24 78.5 kg (173 lb " 1 oz)   ]  BMI: Body mass index is 30.3 kg/m².   IBW: 75 kg (110% IBW)  Estimated Energy/Fluid Requirements:   Weight used: AIBW  75  kg  Calories:  8882-5197  kcal/day (20-21 kcal/kg REE)  Protein:  75  g/day (1 g/kg DRI)  Fluid:  1800  mL/day or  60  oz/day    Supplements/Vitamins:    MVI/Supp: yes  and None Noted   Activity Level:     Low Active      Form of Activity: walking    Allergies/Intolerances:     No known food allergies     Food/Nutrition-related hx:   Feels she resorts to emotional eating but notes overall decrease in weight now that she has assumed shopping and cooking responsibilities; fast food - 5 guys with  but lately avoids and cooks at home .  Appetite: Fair, consumes snacks when stressed - ice cream, cookies, chips.    Fluid Intake: Adequate[- water, almond milk , coffee in am   Diet Recall:  Breakfast: 1/2 bagel and coffee with almond milk  Lunch: sandwich with turkey or cheese on multigrain bread with corado and spinach or salad with lettuce, onion, canned beans, cherry tomatoes, leftover broccoli or cauliflower ( cooked or raw -) salad dressing - italian or ranch   Dinner: baked chicken /red beans /rice with sausage or tasso   Snacks: nectarines, chips, cookies (  used to buy these and other sweets ) , ice cream   ONS:none    Dietary preferences:   Vegetables: Likes a variety. Eats almost daily.  Fruits: Likes a variety. Eats daily.  Fluid Intake/Beverages:  water, coffee , almond milk   Dining out: Reduced lately. Mostly fast food.  Cooking at home: Daily. Mostly baked and crock pot   meat, starchy CHO, and vegetables.  Grocery Shopping and food prep done by patient .       Medical Hx  Past Medical History:   Diagnosis Date    Diabetes mellitus     Hypertension       Past Surgical History:   Procedure Laterality Date    BREAST SURGERY Left 2021    HYSTEROSCOPY      ROTATOR CUFF REPAIR Left         Labs:   Lab Results   Component Value Date     (H) 03/20/2024    K 4.7  "03/20/2024    ALBUMIN 4.8 03/20/2024    CALCIUM 10.3 03/20/2024     No results found for: "CXMJYXLW09UE", "WXNHUMZE32"  No results found for: "HGB", "HCT", "MCV"  Lab Results   Component Value Date    CREATININE 0.67 03/20/2024    ALBUMIN 4.8 03/20/2024 7/25/23  [facility]   WBC - [facility] 4.5 - 11.0 Thousand/uL 4.1 Low    RBC - [facility] 4.20 - 5.40 Million/uL 4.17 Low    HGB - [facility] 12.0 - 16.0 gm/dL 13.6   HCT - [facility] 37.0 - 47.0 % 39.7   MCV - [facility] 81.0 - 99.0 fL 95.3   MCH - [facility] 27.0 - 33.0 pg 32.5   MCHC - [facility] 32.0 - 36.0 g/dL 34.1   RDW - [facility] 12.0 - 15.3 % 13.6   Platelet Count, Automated - [facility] 150 - 350 Thousand/uL 210   Mean Platelet Volume - [facility] 7.0 - 10.2 fL 10.3 High      No results found for: "CRP"  No components found for: "FROLATE"  No results found for: "IRON"     D = NUTRITION DIAGNOSIS    PES Statement:   Primary Problem: Reflux  related to emotional /behavioral impacts on acceptance of foods  as evidenced by diet recall.      Secondary Problem: Altered GI function  related to chronic constipation  as evidenced by dependence on stool softener, Linzess and docusate sodium to facilitate bowel movement   A=ASSESSMENT Elizabeth Felipe is a 74 y.o. female  has been dealing with GI issues for years,  but has noted increasing constipation since starting gabapentin .    Patient has experienced weight has decreased 30# pounds over last 2 years   from usual body weight  of  210# to 180#  2/2 .PO intake <50% of estimated energy needs per diet recall/diet log. change in bowel habits, constipation, or need for manual assistance  nausea, pyrosis (heartburn), GERD, and constipation. Reviewed pertinent lab values decreased WBC 7/25/23 Patient has/has not been previously educated on anti reflux diet immediately following diagnosis. Family is not currently following any special diet . Challenges to making changes are prep and cooking . Session was spent discussing diet therapy  Also discussed symptoms/trigger food log " to help identify problem foods.  Patient  verbalized understanding.       I = NUTRITION INTERVENTION       Ensure adequate fluid intake of 64 oz.    Limit High fat foods -avocado, nuts and nut butters, fried foods , gravies, cheese, fatty snack foods (potato chips, dips ), high fat meats such as sausage, hot dogs, barbecued fatty meats , heavy use of condiments such as mayonnaise, butter , vegetable spread, sour cream    Reduce digestive effort by stomach and bowel by cooking foods in slow cooker or instapot ; chopping into smaller pieces or mechanically modifying by using an immersion  or  ; avoiding whole seeds and nuts , dried fruit and peeling fruits and vegetables with tough skin .    Reflux -avoid caffeine, chocolate, large meals, high fat foods - fried, high fat meats ; casseroles which may contain high fat cheese; carbonated beverages, mint containing foods , alcohol . Equally important is to allow 2-3 hours after meal before laying down ; limit exercising involving bending from waist, jumping - modify exercises to sit upright .    5.   Discussed connection between anxiety and GERD and provided resources   Education Materials Provided and Discussed: GERD diet: Foods to avoid to reduce acid reflux - [facility]  Acid reflux and anxiety: What is the link? ([employer])    Dropping acid by Yevgeniy Villafana MD  GERD Diet: Foods That Help with Acid Reflux (Heartburn)  [facility]  Provided list of community resources for mental health support /grief counselliing     Education Needs Satisfied: yes   Patient Verbalizes understanding: yes   Barriers to Learning: none identified     M/E = NUTRITION MONITORING AND EVALUATION     SMART Goal 1: Patient able to reduce anxiety and deal with grief surrounding son's death utilizing community resources   Indicator: Weight/BMI    SMART Goal 2: Patient adherence to Reflux and anxiety management  diet for dx of GERD without GI  discomfort OR with decrease in GI symptoms by next RD visit.   Indicator: Diet Recall     Follow Up:  3 Weeks    Communication with provider via Epic  Kaitlin Younger RD, MPH, CDE, LPC  Diagnoses:  1. Chronic constipation    2. Gastroesophageal reflux disease, unspecified whether esophagitis present

## 2024-08-01 ENCOUNTER — TELEPHONE (OUTPATIENT)
Dept: GASTROENTEROLOGY | Facility: CLINIC | Age: 74
End: 2024-08-01
Payer: MEDICARE

## 2024-08-01 ENCOUNTER — TELEPHONE (OUTPATIENT)
Dept: UROGYNECOLOGY | Facility: CLINIC | Age: 74
End: 2024-08-01
Payer: MEDICARE

## 2024-08-01 NOTE — TELEPHONE ENCOUNTER
----- Message from Kinjal Wild sent at 8/1/2024  9:54 AM CDT -----  Pt calling to reschedule her Dietician kishor , please call     Confirmed patient's contact info below:  Contact Name: Elizabeth Felipe  Phone Number: [phone]

## 2024-08-01 NOTE — TELEPHONE ENCOUNTER
Contacted pt about appt request for kidney ultrasound results. Per Dr. Arellano, pt needs to contact a urologist about the kidney cyst. Pt has appointment with Aleida on 8/21, wants to keep that appt. Pt verbalized understanding, call ended.

## 2024-08-05 ENCOUNTER — TELEPHONE (OUTPATIENT)
Dept: GASTROENTEROLOGY | Facility: CLINIC | Age: 74
End: 2024-08-05
Payer: MEDICARE

## 2024-08-05 ENCOUNTER — TELEPHONE (OUTPATIENT)
Dept: ENDOSCOPY | Facility: HOSPITAL | Age: 74
End: 2024-08-05
Payer: MEDICARE

## 2024-08-05 DIAGNOSIS — K59.00 CONSTIPATION, UNSPECIFIED CONSTIPATION TYPE: Primary | ICD-10-CM

## 2024-08-07 ENCOUNTER — NUTRITION (OUTPATIENT)
Dept: GASTROENTEROLOGY | Facility: CLINIC | Age: 74
End: 2024-08-07
Payer: MEDICARE

## 2024-08-07 DIAGNOSIS — K21.9 GASTROESOPHAGEAL REFLUX DISEASE, UNSPECIFIED WHETHER ESOPHAGITIS PRESENT: Primary | ICD-10-CM

## 2024-08-07 NOTE — PROGRESS NOTES
GI Nutrition Consult Follow up     8/7/24  Initial Referral/visit Date: 7/3/24  Referring Provider: Carmen Barlow MD  Reason for Nutrition Referral: GERD with cough and hoarseness ; s/p Breast cancer treatment ( lumpectomy and radiation ) Hem-onc  has recommended elimination of PPI due to decreased WBC  Hx of sigmoid bowel resection due to diverticulitis ; Spinal stenosis /sciatica- takes gabapentin   Contributing factors- Dm on metformin ; hypothyroidism   Consultation Time: 60 Minutes  Interim :   Changes since last Visit :   Has begun diet with less acidic foods , drinking more water, reducing coffee and switching to carob based product instead; following Dropping Acid book for recipes .  Has not used slow cooker yet   Anxiety triggers may be impacting GERD more than diet   Notes gaviscon has been most helpful but long term us of such needs to be addressed with Dr Barlow   D = NUTRITION DIAGNOSIS     PES Statement:   Primary Problem: Reflux  related to emotional /behavioral impacts on acceptance of foods  as evidenced by diet recall.       Secondary Problem: Altered GI function  related to chronic constipation  as evidenced by dependence on stool softener, Linzess and docusate sodium to facilitate bowel movement        M/E = NUTRITION MONITORING AND EVALUATION      SMART Goal 1: Patient able to reduce anxiety and deal with grief surrounding son's death utilizing community resources   Indicator: Weight/BMI     SMART Goal 2: Patient adherence to Reflux and anxiety management  diet for dx of GERD without GI discomfort OR with decrease in GI symptoms by next RD visit.   Indicator: Diet Recall       Recommendations :   1.Address long term use of gaviscon with Dr Barlow   2. Community mental Health resources discussed   3. Encouraged  low acid, low fat diet with elimination of GERD triggers previously provided .    Plan : Follow up in 1 month.    Communication with provider via Epic  Kaitlin Younger RD, MPH,  "CDE, LPC  ++++++++++++++++++++++++++++++++++++++++++++++++++++++++++++++++++++++++++++++++++++++++++++++++++++++++++++++++++++++++++++++            Elizabeth Felipe is a 74 y.o. female referred with the following symptoms : GERD - exploring non PPI due to Hem-onc's concern about decreased WBC count . Hx of anxiety exacerbated by 's recent health changes and son's unexpected death which has impacted management of GERD.  Noting PPI and H2 blocker currently with gaviscon added.  used to do cooking ( heavy meals with high fat and cho) ; Patient has assumed cooking responsibilities with lower fat and spice level. Triggers for Reflux more situational than food . Had been prescribed Prozac in past but decreased focus . Cymbalta maintained focus but no effect on anxiety . Needs to be alert and available at night with 's recent health issues.      A = NUTRITION ASSESSMENT        Patient Information:     Elizabeth Felipe  74 y.o.-year-old  White female     Social Data: lives with  and adult daughter; patient prepares meals and does shopping since husbands recent car accident.   .  Anthropometrics:   Relevant Wt hx: notes unintentional weight loss from 210# > 182#   since death of son      Usual Weight: 210#      Body mass index is 30.3 kg/m².  Estimated body mass index is 30.3 kg/m² as calculated from the following:    Height as of 6/27/24: 5' 5" (1.651 m).    Weight as of this encounter: 82.6 kg (182 lb 1.6 oz).      Ht Readings from Last 1 Encounters:   06/27/24 5' 5" (1.651 m)          Wt Readings from Last 12 Encounters:   07/03/24 82.6 kg (182 lb 1.6 oz)   06/27/24 81.3 kg (179 lb 3.7 oz)   04/05/24 78.5 kg (173 lb 1 oz)   ]  BMI: Body mass index is 30.3 kg/m².   IBW: 75 kg (110% IBW)  Estimated Energy/Fluid Requirements:   Weight used: AIBW  75  kg  Calories:  9174-8902  kcal/day (20-21 kcal/kg REE)  Protein:  75  g/day (1 g/kg DRI)  Fluid:  1800  mL/day or  60  oz/day    Supplements/Vitamins:   " "  MVI/Supp: yes  and None Noted    Activity Level:      Low Active       Form of Activity: walking     Allergies/Intolerances:      No known food allergies      Food/Nutrition-related hx:   Feels she resorts to emotional eating but notes overall decrease in weight now that she has assumed shopping and cooking responsibilities; fast food - 5 guys with  but lately avoids and cooks at home .  Appetite: Fair, consumes snacks when stressed - ice cream, cookies, chips.     Fluid Intake: Adequate[- water, almond milk , coffee in am   Diet Recall:  Breakfast: 1/2 bagel and coffee with almond milk  Lunch: sandwich with turkey or cheese on multigrain bread with corado and spinach or salad with lettuce, onion, canned beans, cherry tomatoes, leftover broccoli or cauliflower ( cooked or raw -) salad dressing - italian or ranch   Dinner: baked chicken /red beans /rice with sausage or tasso   Snacks: nectarines, chips, cookies (  used to buy these and other sweets ) , ice cream   ONS:none     Dietary preferences:   Vegetables: Likes a variety. Eats almost daily.  Fruits: Likes a variety. Eats daily.  Fluid Intake/Beverages:  water, coffee , almond milk   Dining out: Reduced lately. Mostly fast food.  Cooking at home: Daily. Mostly baked and crock pot   meat, starchy CHO, and vegetables.  Grocery Shopping and food prep done by patient .         Medical Hx       Past Medical History:   Diagnosis Date    Diabetes mellitus      Hypertension              Past Surgical History:   Procedure Laterality Date    BREAST SURGERY Left 2021    HYSTEROSCOPY        ROTATOR CUFF REPAIR Left            Labs:         Lab Results   Component Value Date      (H) 03/20/2024     K 4.7 03/20/2024     ALBUMIN 4.8 03/20/2024     CALCIUM 10.3 03/20/2024      No results found for: "KMTNNSMA15AT", "EAUMLBXR28"  No results found for: "HGB", "HCT", "MCV"        Lab Results   Component Value Date     CREATININE 0.67 03/20/2024     ALBUMIN 4.8 " "03/20/2024 7/25/23  [facility]   WBC - [facility] 4.5 - 11.0 Thousand/uL 4.1 Low    RBC - [facility] 4.20 - 5.40 Million/uL 4.17 Low    HGB - [facility] 12.0 - 16.0 gm/dL 13.6   HCT - [facility] 37.0 - 47.0 % 39.7   MCV - [facility] 81.0 - 99.0 fL 95.3   MCH - [facility] 27.0 - 33.0 pg 32.5   MCHC - [facility] 32.0 - 36.0 g/dL 34.1   RDW - [facility] 12.0 - 15.3 % 13.6   Platelet Count, Automated - [facility] 150 - 350 Thousand/uL 210   Mean Platelet Volume - [facility] 7.0 - 10.2 fL 10.3 High       No results found for: "CRP"  No components found for: "FROLATE"  No results found for: "IRON"      D = NUTRITION DIAGNOSIS     PES Statement:   Primary Problem: Reflux  related to emotional /behavioral impacts on acceptance of foods  as evidenced by diet recall.       Secondary Problem: Altered GI function  related to chronic constipation  as evidenced by dependence on stool softener, Linzess and docusate sodium to facilitate bowel movement   A=ASSESSMENT Elizabeth Felipe is a 74 y.o. female  has been dealing with GI issues for years,  but has noted increasing constipation since starting gabapentin .    Patient has experienced weight has decreased 30# pounds over last 2 years   from usual body weight  of  210# to 180#  2/2 .PO intake <50% of estimated energy needs per diet recall/diet log. change in bowel habits, constipation, or need for manual assistance  nausea, pyrosis (heartburn), GERD, and constipation. Reviewed pertinent lab values decreased WBC 7/25/23 Patient has/has not been previously educated on anti reflux diet immediately following diagnosis. Family is not currently following any special diet . Challenges to making changes are prep and cooking . Session was spent discussing diet therapy  Also discussed symptoms/trigger food log to help identify problem foods.  Patient  verbalized understanding.         I = NUTRITION INTERVENTION         Ensure adequate fluid intake of 64 oz.    Limit High fat foods -avocado, nuts and nut butters, fried foods , gravies, cheese, " fatty snack foods (potato chips, dips ), high fat meats such as sausage, hot dogs, barbecued fatty meats , heavy use of condiments such as mayonnaise, butter , vegetable spread, sour cream    Reduce digestive effort by stomach and bowel by cooking foods in slow cooker or instapot ; chopping into smaller pieces or mechanically modifying by using an immersion  or  ; avoiding whole seeds and nuts , dried fruit and peeling fruits and vegetables with tough skin .    Reflux -avoid caffeine, chocolate, large meals, high fat foods - fried, high fat meats ; casseroles which may contain high fat cheese; carbonated beverages, mint containing foods , alcohol . Equally important is to allow 2-3 hours after meal before laying down ; limit exercising involving bending from waist, jumping - modify exercises to sit upright .    5.   Discussed connection between anxiety and GERD and provided resources   Education Materials Provided and Discussed: GERD diet: Foods to avoid to reduce acid reflux - [facility]  Acid reflux and anxiety: What is the link? ([employer].com)     Dropping acid by Yevgeniy Villafana MD  GERD Diet: Foods That Help with Acid Reflux (Heartburn)  [facility]  Provided list of community resources for mental health support /grief counselliing      Education Needs Satisfied: yes   Patient Verbalizes understanding: yes   Barriers to Learning: none identified      M/E = NUTRITION MONITORING AND EVALUATION      SMART Goal 1: Patient able to reduce anxiety and deal with grief surrounding son's death utilizing community resources   Indicator: Weight/BMI     SMART Goal 2: Patient adherence to Reflux and anxiety management  diet for dx of GERD without GI discomfort OR with decrease in GI symptoms by next RD visit.   Indicator: Diet Recall      Follow Up:  3 Weeks

## 2024-08-21 ENCOUNTER — OFFICE VISIT (OUTPATIENT)
Dept: UROGYNECOLOGY | Facility: CLINIC | Age: 74
End: 2024-08-21
Payer: MEDICARE

## 2024-08-21 VITALS
DIASTOLIC BLOOD PRESSURE: 67 MMHG | HEART RATE: 73 BPM | HEIGHT: 65 IN | SYSTOLIC BLOOD PRESSURE: 115 MMHG | BODY MASS INDEX: 29.61 KG/M2 | WEIGHT: 177.69 LBS

## 2024-08-21 DIAGNOSIS — Z46.89 PESSARY MAINTENANCE: ICD-10-CM

## 2024-08-21 DIAGNOSIS — N81.4 UTEROVAGINAL PROLAPSE: ICD-10-CM

## 2024-08-21 DIAGNOSIS — N95.2 VAGINAL ATROPHY: ICD-10-CM

## 2024-08-21 DIAGNOSIS — N39.46 URINARY INCONTINENCE, MIXED: Primary | ICD-10-CM

## 2024-08-21 DIAGNOSIS — N81.11 CYSTOCELE, MIDLINE: ICD-10-CM

## 2024-08-21 DIAGNOSIS — N81.6 RECTOCELE, FEMALE: ICD-10-CM

## 2024-08-21 PROCEDURE — 3288F FALL RISK ASSESSMENT DOCD: CPT | Mod: CPTII,S$GLB,, | Performed by: NURSE PRACTITIONER

## 2024-08-21 PROCEDURE — 3008F BODY MASS INDEX DOCD: CPT | Mod: CPTII,S$GLB,, | Performed by: NURSE PRACTITIONER

## 2024-08-21 PROCEDURE — 1101F PT FALLS ASSESS-DOCD LE1/YR: CPT | Mod: CPTII,S$GLB,, | Performed by: NURSE PRACTITIONER

## 2024-08-21 PROCEDURE — 1126F AMNT PAIN NOTED NONE PRSNT: CPT | Mod: CPTII,S$GLB,, | Performed by: NURSE PRACTITIONER

## 2024-08-21 PROCEDURE — 99213 OFFICE O/P EST LOW 20 MIN: CPT | Mod: S$GLB,,, | Performed by: NURSE PRACTITIONER

## 2024-08-21 PROCEDURE — 1160F RVW MEDS BY RX/DR IN RCRD: CPT | Mod: CPTII,S$GLB,, | Performed by: NURSE PRACTITIONER

## 2024-08-21 PROCEDURE — 3078F DIAST BP <80 MM HG: CPT | Mod: CPTII,S$GLB,, | Performed by: NURSE PRACTITIONER

## 2024-08-21 PROCEDURE — 4010F ACE/ARB THERAPY RXD/TAKEN: CPT | Mod: CPTII,S$GLB,, | Performed by: NURSE PRACTITIONER

## 2024-08-21 PROCEDURE — 99999 PR PBB SHADOW E&M-EST. PATIENT-LVL IV: CPT | Mod: PBBFAC,,, | Performed by: NURSE PRACTITIONER

## 2024-08-21 PROCEDURE — 1159F MED LIST DOCD IN RCRD: CPT | Mod: CPTII,S$GLB,, | Performed by: NURSE PRACTITIONER

## 2024-08-21 PROCEDURE — 3074F SYST BP LT 130 MM HG: CPT | Mod: CPTII,S$GLB,, | Performed by: NURSE PRACTITIONER

## 2024-08-21 NOTE — PATIENT INSTRUCTIONS
1) Stage 3-4 uterovaginal prolapse:  --continue GH pessary  --options: continue pessary use vs surgery              --if continue pessary use, use lubricants as below on regular basis                            --if surgery: consider [place] colpocleisis (closing vagina around uterus) if pathology from recent H/S D&C NEG; no longer concerned with intercourse                          --pap 2023 normal; no h/o abnormal paps              --if surgery: if wants to maintain ability to have intercourse, would do robotic-hysterectomy + removal of tubes/ovaries + sacral colpopexy              --if surgery:  bladder testing (urodynamics) to see if needs sling for stress incontinence                          --recent cysto 4-4-2024 with Orlando pendleton              --if surgery: need to complete evaluation to r/o GI contribution to bleeding--has GI appt June; rectal exam grossly NEG for blood today              --if surgery: follow up pathology from 4-4-2024 OR              --if surgery: clearance per PCP (Jamison) + labs (A1c, TSH, CBC, CMP, T&S)/EKG                          --had recent TTE/CT angio CA++, ok     2)  Chronic constipation:  --GI consult 6/2023  --continue to avoid straining with BMs  --continue linzess, colace, scoop of benefiber--take with large glass of water, not coffee  --how to slowly increase fiber:  Take 1 tsp of fiber powder (psyllium or other sugar-free powder).  Mix in 8 oz of water.  Take x 3-5 days.  Then, increase fiber by 1 tsp every 3-5 days until stool is easy to pass.  Stop and continue at that dose.   Do not exceed 6 tsps/day.  May also use over the counter stool softener 1-2 x/day.  AVOID laxatives.  --hydrate well  --consider pelvic floor PT to work on not straining as much with bowel movements     3)  Vaginal atrophy (dryness):    --use Non-estrogen options for vaginal dryness--will help pessary not rub:  --Use REPLENS or REFRESH OTC: 1/2 applicator full in vagina twice a week.    --coconut oil:  dime-sized amount with finger (as far as can reach internally) and around the vaginal opening and inner lips at least 3x/week     4)  Mixed urinary incontinence, urge > stress:    --Empty bladder every 3 hours.  Empty well: wait a minute, lean forward on toilet.    --Avoid dietary irritants (see sheet).  Keep diary x 3-5 days to determine your irritants.  --KEGELS: do 10 in AM and 10 in PM, holding each x 10 seconds.  When you feel urge to go, STOP, KEGEL, and when urge has passed, then go to bathroom.  Consider PT in future.    --URGE: consider medication in future. Takes 2-4 weeks to see if will have effect.  For dry mouth: get sour, sugar free lozenge or gum.  Does not want to try medications at this time  --STRESS:  Pessary vs. Sling.      5)  RTC 3-4 for pessary check--send me a message about what bacteria is growing in the urine

## 2024-08-21 NOTE — PROGRESS NOTES
Urogyn follow up  08/21/2024  .  [facility] [place]  [address]  [facility] [address]    Elizabeth Felipe  [id number]  1950      Elizabeth Felipe is a 74 y.o. here for a urogyn follow up for pessary check/.    Last HPI from 04/05/2024  1)  UI:  (+) KEN < (+) UUI  X years.  (+) pads:1/day, usually minimum wetness and 1/night usually minimum wetness unless has UUI on way to BR.  Daytime frequency: Q 1 hours.  Nocturia: Yes: 3/night.   (--) dysuria,  (--) hematuria--was told has blood in urine sometimes,  (--) frequent UTIs.  (--) complete bladder emptying. Has to strain.   --cysto 4/4/2023: normal     2)  POP:  Present x year. worsening. below introitus. Saw Dr. Perry 2023. ASC vs colpocleisis was discussed. Tried pessary Fall 2023--comfortable.  Was set up with Q2-3 months pessary checks.  Couldn't remove independently. Saw [facility] 1/2024 for pessary cleaning. Became uncomfortable + bleeding after pessary was removed 2 weeks ago. Symptoms:(+)  pressure, discomfort.  (+) vaginal bleeding. (--) vaginal discharge. (--) sexually active--done with this. Partner has some health issues.  (--) h/o dyspareunia.  (+)  Vaginal dryness. Using aquaphor PRN.   (--) vaginal estrogen use.   POP-Q:  Aa +2; Ba +2; C +2; Ap 0; Bp 0; D -3.  Genital hiatus 4, perineal body 3, total vaginal length 10 (poor Valsalva effort-moves more with cough, seems like may be close to UV POP if cervix on traction).   Pvr 30 mL     3)  BM:  (+) constipation/straining. Takes linzess, colace, scoop of benefiber in coffee. Stool consistency on this regimen is normal.   (--) chronic diarrhea. (--) hematochezia.  (--) fecal incontinence.  (--) fecal smearing/urgency.  (--) complete evacuation.  Has to double defecate. Sometimes perianally splints. No rectal prolapse.      4)   bleeding:  --started around Dec 2023  --colonoscopy 2022, polyps per report; has GI appt (Suha Barlow in June 2024)  --11/2023 pap normal; no HPV  --had Dx LSC +  H/S, D&C 4-4-2024 with [place]:  Findings: Normal external female genitalia, patulous large cervix with stage III incomplete uterovaginal prolapse, anterior vaginal wall defect in posterior vaginal wall defect, atrophic vaginal tissue without any lesions or ulcerates to the vaginal epithelium to suggest bleeding was from the vagina, intra-abdominal survey revealed lower pelvis without any significant adhesive disease noted, uterus was small with a posterior pedunculated fibroid small subserosal fibroid, small right ovary, unable to visualize tube on right or left side, left ovary appeared to be slightly adhesed some epiploic on the left colon, right upper quadrant was completely obliterated by omental adhesions, umbilical entry site was without any notable adhesions, left upper quadrant had omental adhesions extended beyond the floxiform ligament. Hysteroscopic findings uterus sounded to approximately 7 cm, long lower uterine and cervical segment, cervical polyp noted the just proximal to the endocervical canal, intrauterine cavity with significant adhesions like tissue unable to visualize the ostia bilaterally, fluid deficit measured 200 by the machine but there was spillage on the floor the probably accounts for at least 100 so I would calculate deficit less than 100 ml. Cystoscopic findings, bladder urothelium with chronic cystitis, with cystitis cystica, no abnormal vascular patterns, no masses/lesions in urothelial noted, no diverticula, brisk efflux of clear urine seen from bilateral ureteral orifices, large floppy bladder with the interureteric ridge right at the bladder neck. Urethra normal without any lesions. Tenaculum site hemostatic that did require cautery.   --endometrial curettings + polyp sent for path: pending       05/15/2024  1) Stage 3-4 uterovaginal prolapse:  --using GH pessary     2)  Chronic constipation:  --taking linzess occasionally  --using fiber supplement daily  --going to pelvic floor  PT   --BM has improved with pessary in place     3)  Vaginal atrophy (dryness):    --not using vaginal lubrication     4)  Mixed urinary incontinence, urge > stress:    --+KEN/UUI  --using 1/ pad/ day with minimal wetness  --voiding 1 1/2-2 hours  --nocturia 3-5/ night--limits fluids prior to bedtime.    Changes since last visit:  Had hematuria-- complex cyst noted  Uti--currently on cipro  + constipation--taking linzess intermittently  Nocturia / ui improved with pessary      Past Medical History:   Diagnosis Date    Diabetes mellitus     Hypertension    --breast CA: s/p L lumpectomy 2022 (E2 sens); s/p rad Tx; declined E2 blocker Tx; followed by Dr. Schmidt at  (oncology)  --DM2: metformin; sometimes checks BG; reports A1c 5+%; +neuropathy from spinal stenosis; no secondary disease  --spinal stenosis: sciatica; takes gabapentin; can walk fine  --GERD  --hypothyroidism  --HA: takes excederin PRN; no amerge   --Fuchs heterochondria      2/2024 TTE:   The left ventricular ejection fraction is normal.   Ejection Fraction = 60-65%.   Grade I diastolic dysfunction, (abnormal relaxation pattern).   The right ventricular systolic function is normal.   Mild aortic regurgitation.   There is mild tricuspid regurgitation.   The inferior vena cava is normal in size with normal collapsibility      2/2024 CT angio coronary: IMPRESSION:   CORONARY CALCIUM SCORE 0.   MILD MYOCARDIAL BRIDGING OF THE LAD. NO SIGNIFICANT STENOSIS. NO PLAQUE FORMATION.        Past Surgical History:   Procedure Laterality Date    BREAST SURGERY Left 2021    HYSTEROSCOPY      ROTATOR CUFF REPAIR Left        Family History   Family history unknown: Yes       Social History     Socioeconomic History    Marital status:    Tobacco Use    Smoking status: Never    Smokeless tobacco: Never   Substance and Sexual Activity    Alcohol use: Not Currently    Drug use: Never    Sexual activity: Not Currently     Social Determinants of Health     Financial  Resource Strain: Low Risk  (8/20/2024)    Overall Financial Resource Strain (CARDIA)     Difficulty of Paying Living Expenses: Not hard at all   Food Insecurity: No Food Insecurity (8/20/2024)    Hunger Vital Sign     Worried About Running Out of Food in the Last Year: Never true     Ran Out of Food in the Last Year: Never true   Transportation Needs: No Transportation Needs (7/8/2024)    Received from [facility] - Transportation     Lack of Transportation (Medical): No     Lack of Transportation (Non-Medical): No   Physical Activity: Inactive (8/20/2024)    Exercise Vital Sign     Days of Exercise per Week: 0 days     Minutes of Exercise per Session: 0 min   Stress: No Stress Concern Present (8/20/2024)    [employer] - Occupational Stress Questionnaire     Feeling of Stress : Only a little   Housing Stability: Unknown (8/20/2024)    Housing Stability Vital Sign     Unable to Pay for Housing in the Last Year: No       Current Outpatient Medications   Medication Sig Dispense Refill    blood sugar diagnostic Strp 1 strip by Other route.      celecoxib (CELEBREX) 200 MG capsule Take 1 capsule twice a day by oral route for 90 days.      clotrimazole-betamethasone 1-0.05% (LOTRISONE) cream       dutasteride (AVODART) 0.5 mg capsule       ezetimibe (ZETIA) 10 mg tablet       famotidine (PEPCID) 20 MG tablet       fluticasone propionate (FLONASE) 50 mcg/actuation nasal spray       gabapentin (NEURONTIN) 100 MG capsule Take 1 capsule 3 times a day by oral route as needed for 90 days.      lancets 28 gauge Misc FreeStyle Lancets 28 gauge      levothyroxine (SYNTHROID) 75 MCG tablet       linaCLOtide (LINZESS) 145 mcg Cap capsule Take 1 capsule (145 mcg total) by mouth before breakfast. 30 capsule 11    lisinopriL (PRINIVIL,ZESTRIL) 5 MG tablet       meclizine (ANTIVERT) 12.5 mg tablet       metFORMIN (GLUCOPHAGE) 500 MG tablet       naratriptan (AMERGE) 2.5 MG tablet        No  "current facility-administered medications for this visit.       Review of patient's allergies indicates:  No Known Allergies    Well woman:  Pap test: 11/2023 normal/no HPV reflexed.  History of abnormal paps: No.  History of STIs:  No  Mammogram: Date of last: 9/2023.  Result: Normal  Colonoscopy: Date of last: 2022 ([facility]).  Result:  polyps.  Repeat due:  2027.    DEXA:  Date of last: 6/2023.  Result:  osteopenia, FRAX not sig elevated.  Repeat due:  per PCP. Taking Vitamin D.     ROS:  As per HPI.      Exam  /67 (BP Location: Right arm, Patient Position: Sitting, BP Method: Medium (Automatic))   Pulse 73   Ht 5' 5" (1.651 m)   Wt 80.6 kg (177 lb 11.1 oz)   BMI 29.57 kg/m²   General: alert and oriented, no acute distress  Respiratory: normal respiratory effort  Abd: soft, non-tender, non-distended    Pelvic  Ext. Genitalia: normal external genitalia. Normal bartholin's and skeens glands  Vagina: + atrophy. Normal vaginal mucosa without lesions. No discharge noted.   Non-tender bladder base without palpable mass.  GH  LS pessary in place  Cervix: no lesions  Uterus: normal size, shape, position, mobile, nontender  Urethra: no masses or tenderness  Urethral meatus: no lesions, caruncle or prolapse.    Pessary removed without difficulty. Washed with soap and water. Reinserted without diffiuclty    Impression  1. Urinary incontinence, mixed        2. Uterovaginal prolapse        3. Cystocele, midline        4. Rectocele, female        5. Vaginal atrophy        6. Pessary maintenance            We reviewed the above issues and discussed options for short-term versus long-term management of her problems.   Plan:   1) Stage 3-4 uterovaginal prolapse:  --continue GH pessary  --options: continue pessary use vs surgery              --if continue pessary use, use lubricants as below on regular basis                            --if surgery: consider LeFort colpocleisis (closing vagina around uterus) if pathology " from recent H/S D&C NEG; no longer concerned with intercourse                          --pap 2023 normal; no h/o abnormal paps              --if surgery: if wants to maintain ability to have intercourse, would do robotic-hysterectomy + removal of tubes/ovaries + sacral colpopexy              --if surgery:  bladder testing (urodynamics) to see if needs sling for stress incontinence                          --recent cysto 4-4-2024 with Orlando pendleton              --if surgery: need to complete evaluation to r/o GI contribution to bleeding--has GI appt June; rectal exam grossly NEG for blood today              --if surgery: follow up pathology from 4-4-2024 OR              --if surgery: clearance per PCP (Jamison) + labs (A1c, TSH, CBC, CMP, T&S)/EKG                          --had recent TTE/CT angio CA++, ok     2)  Chronic constipation:  --GI consult 6/2023  --continue to avoid straining with BMs  --continue linzess, colace, scoop of benefiber--take with large glass of water, not coffee  --how to slowly increase fiber:  Take 1 tsp of fiber powder (psyllium or other sugar-free powder).  Mix in 8 oz of water.  Take x 3-5 days.  Then, increase fiber by 1 tsp every 3-5 days until stool is easy to pass.  Stop and continue at that dose.   Do not exceed 6 tsps/day.  May also use over the counter stool softener 1-2 x/day.  AVOID laxatives.  --hydrate well  --consider pelvic floor PT to work on not straining as much with bowel movements     3)  Vaginal atrophy (dryness):    --use Non-estrogen options for vaginal dryness--will help pessary not rub:  --Use REPLENS or REFRESH OTC: 1/2 applicator full in vagina twice a week.    --coconut oil: dime-sized amount with finger (as far as can reach internally) and around the vaginal opening and inner lips at least 3x/week     4)  Mixed urinary incontinence, urge > stress:    --Empty bladder every 3 hours.  Empty well: wait a minute, lean forward on toilet.    --Avoid dietary irritants (see  sheet).  Keep diary x 3-5 days to determine your irritants.  --KEGELS: do 10 in AM and 10 in PM, holding each x 10 seconds.  When you feel urge to go, STOP, KEGEL, and when urge has passed, then go to bathroom.  Consider PT in future.    --URGE: consider medication in future. Takes 2-4 weeks to see if will have effect.  For dry mouth: get sour, sugar free lozenge or gum.  Does not want to try medications at this time  --STRESS:  Pessary vs. Sling.      5)  RTC 3-4 for pessary check--send me a message about what bacteria is growing in the urine     I spent a total of 20 minutes on the day of the visit.  This includes face to face time and non-face to face time preparing to see the patient (eg, review of tests), obtaining and/or reviewing separately obtained history, documenting clinical information in the electronic or other health record, independently interpreting results and communicating results to the patient/family/caregiver, or care coordinator.       Aleida Friedman, EMILE-BC  [facility]  Division of Female Pelvic Medicine and Reconstructive Surgery  Department of Obstetrics & Gynecology

## 2024-08-22 ENCOUNTER — PATIENT MESSAGE (OUTPATIENT)
Dept: UROGYNECOLOGY | Facility: CLINIC | Age: 74
End: 2024-08-22
Payer: MEDICARE

## 2024-08-22 DIAGNOSIS — N39.0 RECURRENT UTI: Primary | ICD-10-CM

## 2024-08-26 ENCOUNTER — TELEPHONE (OUTPATIENT)
Dept: GASTROENTEROLOGY | Facility: CLINIC | Age: 74
End: 2024-08-26
Payer: MEDICARE

## 2024-08-26 NOTE — TELEPHONE ENCOUNTER
----- Message from Michell Anglin sent at 8/26/2024  1:34 PM CDT -----  Regarding: Call requested  Contact: [phone]  Hi, pt is scheduled for a procedure on 09/25/24 and would like to spk with the nurse to discuss. Pls call the pt at  [phone].    Thank you.

## 2024-08-28 ENCOUNTER — LAB VISIT (OUTPATIENT)
Dept: LAB | Facility: HOSPITAL | Age: 74
End: 2024-08-28
Attending: NURSE PRACTITIONER
Payer: MEDICARE

## 2024-08-28 DIAGNOSIS — N39.0 RECURRENT UTI: ICD-10-CM

## 2024-08-28 PROCEDURE — 87086 URINE CULTURE/COLONY COUNT: CPT | Performed by: NURSE PRACTITIONER

## 2024-08-29 LAB
BACTERIA UR CULT: NORMAL
BACTERIA UR CULT: NORMAL

## 2024-09-03 ENCOUNTER — PATIENT MESSAGE (OUTPATIENT)
Dept: GASTROENTEROLOGY | Facility: CLINIC | Age: 74
End: 2024-09-03
Payer: MEDICARE

## 2024-09-03 DIAGNOSIS — K59.00 CONSTIPATION, UNSPECIFIED CONSTIPATION TYPE: ICD-10-CM

## 2024-09-05 ENCOUNTER — PATIENT MESSAGE (OUTPATIENT)
Dept: GASTROENTEROLOGY | Facility: CLINIC | Age: 74
End: 2024-09-05
Payer: MEDICARE

## 2024-09-06 ENCOUNTER — TELEPHONE (OUTPATIENT)
Dept: GASTROENTEROLOGY | Facility: CLINIC | Age: 74
End: 2024-09-06
Payer: MEDICARE

## 2024-09-06 NOTE — TELEPHONE ENCOUNTER
----- Message from Randi Flores sent at 9/6/2024 10:49 AM CDT -----  Regarding: Returning a Missed Call      Caller:   Elizabeth      Returning call to:   Mary       Caller can be reached @:  [phone]       Nature of the call:  Pt will need to schedule an appt with dietitian Ms. Kaitlin Younger.

## 2024-09-09 ENCOUNTER — TELEPHONE (OUTPATIENT)
Dept: GASTROENTEROLOGY | Facility: CLINIC | Age: 74
End: 2024-09-09
Payer: MEDICARE

## 2024-09-09 NOTE — TELEPHONE ENCOUNTER
----- Message from Tanisha Singh sent at 9/9/2024  2:36 PM CDT -----  Regarding: appt  Contact: [phone]  Patient is calling to cancel and reschedule her appt for Wednesday 09/11 at 1pm please contact patient at [phone]

## 2024-09-10 ENCOUNTER — TELEPHONE (OUTPATIENT)
Dept: ENDOSCOPY | Facility: HOSPITAL | Age: 74
End: 2024-09-10
Payer: MEDICARE

## 2024-09-10 NOTE — TELEPHONE ENCOUNTER
Pollo Tapia MA2 weeks ago     TB  See patient message.         Note     Pollo Tapia MA2 weeks ago     TB  ----- Message from Michell Anglin sent at 8/26/2024  1:34 PM CDT -----  Regarding: Call requested  Contact: [phone]  Hi, pt is scheduled for a procedure on 09/25/24 and would like to spk with the nurse to discuss. Pls call the pt at       [phone].     Thank you.

## 2024-09-10 NOTE — TELEPHONE ENCOUNTER
Contacted the patient to schedule an endoscopy procedure(s) 9/10/24. The patient did not answer the call and left a voice message requesting a call back.

## 2024-09-18 ENCOUNTER — NUTRITION (OUTPATIENT)
Dept: GASTROENTEROLOGY | Facility: CLINIC | Age: 74
End: 2024-09-18
Payer: MEDICARE

## 2024-09-18 DIAGNOSIS — K21.9 GASTROESOPHAGEAL REFLUX DISEASE, UNSPECIFIED WHETHER ESOPHAGITIS PRESENT: ICD-10-CM

## 2024-09-18 DIAGNOSIS — K59.00 CONSTIPATION, UNSPECIFIED CONSTIPATION TYPE: Primary | ICD-10-CM

## 2024-09-18 PROCEDURE — 99999 PR PBB SHADOW E&M-EST. PATIENT-LVL I: CPT | Mod: PBBFAC,,,

## 2024-09-18 NOTE — PROGRESS NOTES
GI Nutrition Consult  Initial Referral/visit Date: 7/3/24  Referring Provider: Carmen Barlow MD  Reason for Nutrition Referral: Reason for Nutrition Referral: GERD with cough and hoarseness ; s/p Breast cancer treatment ( lumpectomy and radiation ) Hem-onc  has recommended elimination of PPI due to decreased WBC  Hx of sigmoid bowel resection due to diverticulitis ; Spinal stenosis /sciatica- takes gabapentin   Contributing factors- Dm on metformin ; hypothyroidism   Consultation Time: 45 Minutes  Changes since last Visit :   Taking famotidine        Interim : 8/7/24  Changes since last Visit :   Has begun diet with less acidic foods , drinking more water, reducing coffee and switching to carob based product instead; following [employer] book for recipes .  Has not used slow cooker yet   Anxiety triggers may be impacting GERD more than diet   Notes gaviscon has been most helpful but long term us of such needs to be addressed with Dr Barlow   Food/Nutrition-related hx:    D = NUTRITION DIAGNOSIS     PES Statement:   Primary Problem: Reflux  related to emotional /behavioral impacts on acceptance of foods  as evidenced by diet recall.       Secondary Problem: Altered GI function  related to chronic constipation  as evidenced by dependence on stool softener, Linzess and docusate sodium to facilitate bowel movement        M/E = NUTRITION MONITORING AND EVALUATION      SMART Goal 1: Patient able to reduce anxiety and deal with grief surrounding son's death utilizing community resources   Indicator: Weight/BMI     SMART Goal 2: Patient adherence to Reflux and anxiety management  diet for dx of GERD without GI discomfort OR with decrease in GI symptoms by next RD visit.   Indicator: Diet Recall      Follow Up: Family/Patient provided with Dietitian contact number and advised to call or make future appointment if further intervention is needed.    Communication with provider via Epic  Kaitlin Younger RD, MPH,  CDE, LPC

## 2024-09-19 ENCOUNTER — TELEPHONE (OUTPATIENT)
Dept: GASTROENTEROLOGY | Facility: CLINIC | Age: 74
End: 2024-09-19
Payer: MEDICARE

## 2024-09-19 NOTE — TELEPHONE ENCOUNTER
Received [email or web address] message to cancel upcoming Anorectal Manometry on 9/25/24. LVM notifying pt of removal from schedule and direct line provided to call to reschedule.

## 2024-09-19 NOTE — TELEPHONE ENCOUNTER
----- Message from Olive Doe sent at 9/19/2024  9:13 AM CDT -----  Regarding: Pt called states she wld like to cancel procedure scheduled for 09/25  Contact: [phone]  Name of Who is Calling:LULY MACKEY [[id number]]        What is the request in detail:Pt called states she wld like to cancel procedure scheduled for 09/25. Please advise         Can the clinic reply by [facility]:No        What Number to Call Back if not in [facility]: Telephone Information:  Mobile          [phone]

## 2024-09-23 ENCOUNTER — TELEPHONE (OUTPATIENT)
Dept: ENDOSCOPY | Facility: HOSPITAL | Age: 74
End: 2024-09-23
Payer: MEDICARE

## 2024-09-23 VITALS — WEIGHT: 182.75 LBS | BODY MASS INDEX: 30.41 KG/M2

## 2024-09-23 NOTE — TELEPHONE ENCOUNTER
Received call from patient requesting to cancel Anorectal Manometry order, pt does not want to complete procedure. Order cancelled per pt request.

## 2024-11-12 ENCOUNTER — TELEPHONE (OUTPATIENT)
Dept: UROGYNECOLOGY | Facility: CLINIC | Age: 74
End: 2024-11-12
Payer: MEDICARE

## 2024-11-12 NOTE — TELEPHONE ENCOUNTER
Pt agreed to being rescheduled to Wed 12/11/24 at 1 pm, pt requested main campus location only. Call ended.

## 2024-11-12 NOTE — TELEPHONE ENCOUNTER
----- Message from Shannan sent at 11/12/2024  8:47 AM CST -----  Name of Who is Calling:LULY MACKEY [[id number]]                What is the request in detail: Pt calling because she have apt on the 11/20, but states her daughter have apt on that same day and she would like to know if they have apt for the 11/22 are any other that be sooner than what came up for me.Please call back to further assist.                 Can the clinic reply by [facility]: No                What Number to Call Back if not [facility]: [phone]

## 2024-12-11 ENCOUNTER — OFFICE VISIT (OUTPATIENT)
Dept: UROGYNECOLOGY | Facility: CLINIC | Age: 74
End: 2024-12-11
Payer: MEDICARE

## 2024-12-11 VITALS
HEART RATE: 68 BPM | BODY MASS INDEX: 28.61 KG/M2 | SYSTOLIC BLOOD PRESSURE: 138 MMHG | DIASTOLIC BLOOD PRESSURE: 78 MMHG | HEIGHT: 65 IN | WEIGHT: 171.75 LBS

## 2024-12-11 DIAGNOSIS — N39.46 URINARY INCONTINENCE, MIXED: ICD-10-CM

## 2024-12-11 DIAGNOSIS — N81.4 UTEROVAGINAL PROLAPSE: ICD-10-CM

## 2024-12-11 DIAGNOSIS — N81.11 CYSTOCELE, MIDLINE: ICD-10-CM

## 2024-12-11 DIAGNOSIS — N89.8 VAGINAL DISCHARGE: Primary | ICD-10-CM

## 2024-12-11 DIAGNOSIS — N81.6 RECTOCELE, FEMALE: ICD-10-CM

## 2024-12-11 DIAGNOSIS — N95.2 VAGINAL ATROPHY: ICD-10-CM

## 2024-12-11 DIAGNOSIS — Z46.89 PESSARY MAINTENANCE: ICD-10-CM

## 2024-12-11 PROCEDURE — 3044F HG A1C LEVEL LT 7.0%: CPT | Mod: CPTII,S$GLB,, | Performed by: NURSE PRACTITIONER

## 2024-12-11 PROCEDURE — 99213 OFFICE O/P EST LOW 20 MIN: CPT | Mod: S$GLB,,, | Performed by: NURSE PRACTITIONER

## 2024-12-11 PROCEDURE — 99999 PR PBB SHADOW E&M-EST. PATIENT-LVL IV: CPT | Mod: PBBFAC,,, | Performed by: NURSE PRACTITIONER

## 2024-12-11 PROCEDURE — 1101F PT FALLS ASSESS-DOCD LE1/YR: CPT | Mod: CPTII,S$GLB,, | Performed by: NURSE PRACTITIONER

## 2024-12-11 PROCEDURE — 3075F SYST BP GE 130 - 139MM HG: CPT | Mod: CPTII,S$GLB,, | Performed by: NURSE PRACTITIONER

## 2024-12-11 PROCEDURE — 1159F MED LIST DOCD IN RCRD: CPT | Mod: CPTII,S$GLB,, | Performed by: NURSE PRACTITIONER

## 2024-12-11 PROCEDURE — 3008F BODY MASS INDEX DOCD: CPT | Mod: CPTII,S$GLB,, | Performed by: NURSE PRACTITIONER

## 2024-12-11 PROCEDURE — 3078F DIAST BP <80 MM HG: CPT | Mod: CPTII,S$GLB,, | Performed by: NURSE PRACTITIONER

## 2024-12-11 PROCEDURE — 1160F RVW MEDS BY RX/DR IN RCRD: CPT | Mod: CPTII,S$GLB,, | Performed by: NURSE PRACTITIONER

## 2024-12-11 PROCEDURE — 3288F FALL RISK ASSESSMENT DOCD: CPT | Mod: CPTII,S$GLB,, | Performed by: NURSE PRACTITIONER

## 2024-12-11 PROCEDURE — 1126F AMNT PAIN NOTED NONE PRSNT: CPT | Mod: CPTII,S$GLB,, | Performed by: NURSE PRACTITIONER

## 2024-12-11 PROCEDURE — 4010F ACE/ARB THERAPY RXD/TAKEN: CPT | Mod: CPTII,S$GLB,, | Performed by: NURSE PRACTITIONER

## 2024-12-11 RX ORDER — METRONIDAZOLE 7.5 MG/G
1 GEL VAGINAL NIGHTLY
Qty: 70 G | Refills: 0 | Status: SHIPPED | OUTPATIENT
Start: 2024-12-11 | End: 2024-12-16

## 2024-12-11 NOTE — PATIENT INSTRUCTIONS
"1) Stage 3-4 uterovaginal prolapse:  --continue 2 1/4 " GH pessary  --options: continue pessary use vs surgery              --if continue pessary use, use lubricants as below on regular basis                            --if surgery: consider LeFort colpocleisis (closing vagina around uterus) if pathology from recent H/S D&C NEG; no longer concerned with intercourse                          --pap 2023 normal; no h/o abnormal paps              --if surgery: if wants to maintain ability to have intercourse, would do robotic-hysterectomy + removal of tubes/ovaries + sacral colpopexy              --if surgery:  bladder testing (urodynamics) to see if needs sling for stress incontinence                          --recent cysto 4-4-2024 with Orlando pendleton              --if surgery: need to complete evaluation to r/o GI contribution to bleeding--has GI appt June; rectal exam grossly NEG for blood today              --if surgery: follow up pathology from 4-4-2024 OR              --if surgery: clearance per PCP (Jamison) + labs (A1c, TSH, CBC, CMP, T&S)/EKG                          --had recent TTE/CT angio CA++, ok     2)  Chronic constipation:  --GI consult 6/2023  --continue to avoid straining with BMs  --continue linzess, colace, scoop of benefiber--take with large glass of water, not coffee  --how to slowly increase fiber:  Take 1 tsp of fiber powder (psyllium or other sugar-free powder).  Mix in 8 oz of water.  Take x 3-5 days.  Then, increase fiber by 1 tsp every 3-5 days until stool is easy to pass.  Stop and continue at that dose.   Do not exceed 6 tsps/day.  May also use over the counter stool softener 1-2 x/day.  AVOID laxatives.  --hydrate well  --consider pelvic floor PT to work on not straining as much with bowel movements     3)  Vaginal atrophy (dryness):    --use Non-estrogen options for vaginal dryness--will help pessary not rub:  --Use REPLENS or REFRESH OTC: 1/2 applicator full in vagina twice a week.  "   --coconut oil: dime-sized amount with finger (as far as can reach internally) and around the vaginal opening and inner lips at least 3x/week     4)  Mixed urinary incontinence, urge > stress:    --Empty bladder every 3 hours.  Empty well: wait a minute, lean forward on toilet.    --Avoid dietary irritants (see sheet).  Keep diary x 3-5 days to determine your irritants.  --KEGELS: do 10 in AM and 10 in PM, holding each x 10 seconds.  When you feel urge to go, STOP, KEGEL, and when urge has passed, then go to bathroom.  Consider PT in future.    --URGE: consider medication in future. Takes 2-4 weeks to see if will have effect.  For dry mouth: get sour, sugar free lozenge or gum.  Does not want to try medications at this time  --STRESS:  Pessary vs. Sling.      5) vaginal discharge  --metrogel 1 applicator nightly x 5 days    6)RTC 3 months for PC

## 2024-12-11 NOTE — PROGRESS NOTES
Urogyn follow up  08/21/2024  .  [facility] [place]  [address]  [facility] [id number]    Elizabeth Felipe  [id number]  1950      Elizabeth Felipe is a 74 y.o. here for a urogyn follow up for pessary check/.    Last HPI from 04/05/2024  1)  UI:  (+) KEN < (+) UUI  X years.  (+) pads:1/day, usually minimum wetness and 1/night usually minimum wetness unless has UUI on way to BR.  Daytime frequency: Q 1 hours.  Nocturia: Yes: 3/night.   (--) dysuria,  (--) hematuria--was told has blood in urine sometimes,  (--) frequent UTIs.  (--) complete bladder emptying. Has to strain.   --cysto 4/4/2023: normal     2)  POP:  Present x year. worsening. below introitus. Saw Dr. Perry 2023. ASC vs colpocleisis was discussed. Tried pessary Fall 2023--comfortable.  Was set up with Q2-3 months pessary checks.  Couldn't remove independently. Saw Orlando 1/2024 for pessary cleaning. Became uncomfortable + bleeding after pessary was removed 2 weeks ago. Symptoms:(+)  pressure, discomfort.  (+) vaginal bleeding. (--) vaginal discharge. (--) sexually active--done with this. Partner has some health issues.  (--) h/o dyspareunia.  (+)  Vaginal dryness. Using aquaphor PRN.   (--) vaginal estrogen use.   POP-Q:  Aa +2; Ba +2; C +2; Ap 0; Bp 0; D -3.  Genital hiatus 4, perineal body 3, total vaginal length 10 (poor Valsalva effort-moves more with cough, seems like may be close to UV POP if cervix on traction).   Pvr 30 mL     3)  BM:  (+) constipation/straining. Takes linzess, colace, scoop of benefiber in coffee. Stool consistency on this regimen is normal.   (--) chronic diarrhea. (--) hematochezia.  (--) fecal incontinence.  (--) fecal smearing/urgency.  (--) complete evacuation.  Has to double defecate. Sometimes perianally splints. No rectal prolapse.      4)   bleeding:  --started around Dec 2023  --colonoscopy 2022, polyps per report; has GI appt (Suha Barlow in June 2024)  --11/2023 pap normal; no HPV  --had Dx LSC +  H/S, D&C 4-4-2024 with Orlando:  Findings: Normal external female genitalia, patulous large cervix with stage III incomplete uterovaginal prolapse, anterior vaginal wall defect in posterior vaginal wall defect, atrophic vaginal tissue without any lesions or ulcerates to the vaginal epithelium to suggest bleeding was from the vagina, intra-abdominal survey revealed lower pelvis without any significant adhesive disease noted, uterus was small with a posterior pedunculated fibroid small subserosal fibroid, small right ovary, unable to visualize tube on right or left side, left ovary appeared to be slightly adhesed some epiploic on the left colon, right upper quadrant was completely obliterated by omental adhesions, umbilical entry site was without any notable adhesions, left upper quadrant had omental adhesions extended beyond the floxiform ligament. Hysteroscopic findings uterus sounded to approximately 7 cm, long lower uterine and cervical segment, cervical polyp noted the just proximal to the endocervical canal, intrauterine cavity with significant adhesions like tissue unable to visualize the ostia bilaterally, fluid deficit measured 200 by the machine but there was spillage on the floor the probably accounts for at least 100 so I would calculate deficit less than 100 ml. Cystoscopic findings, bladder urothelium with chronic cystitis, with cystitis cystica, no abnormal vascular patterns, no masses/lesions in urothelial noted, no diverticula, brisk efflux of clear urine seen from bilateral ureteral orifices, large floppy bladder with the interureteric ridge right at the bladder neck. Urethra normal without any lesions. Tenaculum site hemostatic that did require cautery.   --endometrial curettings + polyp sent for path: pending       05/15/2024  1) Stage 3-4 uterovaginal prolapse:  --using GH pessary     2)  Chronic constipation:  --taking linzess occasionally  --using fiber supplement daily  --going to pelvic floor  PT   --BM has improved with pessary in place     3)  Vaginal atrophy (dryness):    --not using vaginal lubrication     4)  Mixed urinary incontinence, urge > stress:    --+KEN/UUI  --using 1/ pad/ day with minimal wetness  --voiding 1 1/2-2 hours  --nocturia 3-5/ night--limits fluids prior to bedtime.    08/21/2024  Had hematuria-- complex cyst noted  Uti--currently on cipro  + constipation--taking linzess intermittently  Nocturia / ui improved with pessary    Changes since last visit:  Denies pain, bleeding or discharge  Doing well with pessary      Past Medical History:   Diagnosis Date    Diabetes mellitus     Hypertension    --breast CA: s/p L lumpectomy 2022 (E2 sens); s/p rad Tx; declined E2 blocker Tx; followed by Dr. Schmidt at  (oncology)  --DM2: metformin; sometimes checks BG; reports A1c 5+%; +neuropathy from spinal stenosis; no secondary disease  --spinal stenosis: sciatica; takes gabapentin; can walk fine  --GERD  --hypothyroidism  --HA: takes excederin PRN; no amerge   --Fuchs heterochondria      2/2024 TTE:   The left ventricular ejection fraction is normal.   Ejection Fraction = 60-65%.   Grade I diastolic dysfunction, (abnormal relaxation pattern).   The right ventricular systolic function is normal.   Mild aortic regurgitation.   There is mild tricuspid regurgitation.   The inferior vena cava is normal in size with normal collapsibility      2/2024 CT angio coronary: IMPRESSION:   CORONARY CALCIUM SCORE 0.   MILD MYOCARDIAL BRIDGING OF THE LAD. NO SIGNIFICANT STENOSIS. NO PLAQUE FORMATION.        Past Surgical History:   Procedure Laterality Date    BREAST SURGERY Left 2021    HYSTEROSCOPY      ROTATOR CUFF REPAIR Left        Family History   Family history unknown: Yes       Social History     Socioeconomic History    Marital status:    Tobacco Use    Smoking status: Never    Smokeless tobacco: Never   Substance and Sexual Activity    Alcohol use: Not Currently    Drug use: Never     Sexual activity: Not Currently     Social Drivers of Health     Financial Resource Strain: Low Risk  (8/20/2024)    Overall Financial Resource Strain (CARDIA)     Difficulty of Paying Living Expenses: Not hard at all   Food Insecurity: No Food Insecurity (8/20/2024)    Hunger Vital Sign     Worried About Running Out of Food in the Last Year: Never true     Ran Out of Food in the Last Year: Never true   Transportation Needs: No Transportation Needs (7/8/2024)    Received from [facility]    PRAPARE - Transportation     Lack of Transportation (Medical): No     Lack of Transportation (Non-Medical): No   Physical Activity: Inactive (8/20/2024)    Exercise Vital Sign     Days of Exercise per Week: 0 days     Minutes of Exercise per Session: 0 min   Stress: No Stress Concern Present (8/20/2024)    [employer] - Occupational Stress Questionnaire     Feeling of Stress : Only a little   Housing Stability: Low Risk  (11/12/2024)    Received from [facility]    Housing Stability Vital Sign     Unable to Pay for Housing in the Last Year: No     Number of Times Moved in the Last Year: 1     Homeless in the Last Year: No       Current Outpatient Medications   Medication Sig Dispense Refill    blood sugar diagnostic Strp 1 strip by Other route.      celecoxib (CELEBREX) 200 MG capsule Take 1 capsule twice a day by oral route for 90 days.      clotrimazole-betamethasone 1-0.05% (LOTRISONE) cream       dutasteride (AVODART) 0.5 mg capsule       ezetimibe (ZETIA) 10 mg tablet       famotidine (PEPCID) 20 MG tablet       fluticasone propionate (FLONASE) 50 mcg/actuation nasal spray       gabapentin (NEURONTIN) 100 MG capsule Take 1 capsule 3 times a day by oral route as needed for 90 days.      lancets 28 gauge Misc FreeStyle Lancets 28 gauge      levothyroxine (SYNTHROID) 75 MCG tablet       linaCLOtide (LINZESS) 72 mcg Cap capsule Take 1 capsule (72 mcg total) by mouth before breakfast. 30 capsule 11     "lisinopriL (PRINIVIL,ZESTRIL) 5 MG tablet       meclizine (ANTIVERT) 12.5 mg tablet       metFORMIN (GLUCOPHAGE) 500 MG tablet       naratriptan (AMERGE) 2.5 MG tablet       metroNIDAZOLE (METROGEL) 0.75 % (37.5mg/5 gram) vaginal gel Place 1 applicator vaginally every evening. for 5 days 70 g 0     No current facility-administered medications for this visit.       Review of patient's allergies indicates:  No Known Allergies    Well woman:  Pap test: 11/2023 normal/no HPV reflexed.  History of abnormal paps: No.  History of STIs:  No  Mammogram: Date of last: 9/2024.  Result: Normal  Colonoscopy: Date of last: 2022 ([facility]).  Result:  polyps.  Repeat due:  2027.    DEXA:  Date of last: 6/2023.  Result:  osteopenia, FRAX not sig elevated.  Repeat due:  per PCP. Taking Vitamin D.     ROS:  As per HPI.      Exam  /78 (BP Location: Right arm, Patient Position: Sitting)   Pulse 68   Ht 5' 5" (1.651 m)   Wt 77.9 kg (171 lb 11.8 oz)   BMI 28.58 kg/m²   General: alert and oriented, no acute distress  Respiratory: normal respiratory effort  Abd: soft, non-tender, non-distended    Pelvic  Ext. Genitalia: normal external genitalia. Normal bartholin's and skeens glands  Vagina: + atrophy. Normal vaginal mucosa without lesions. Thin yellow discharge noted with fishy odor.   Non-tender bladder base without palpable mass.  2 1/4 " GH  LS pessary in place  Cervix: no lesions  Uterus: normal size, shape, position, mobile, nontender  Urethra: no masses or tenderness  Urethral meatus: no lesions, caruncle or prolapse.    Pessary removed without difficulty. Washed with soap and water. Reinserted without diffiuclty    Impression  1. Vaginal discharge  metroNIDAZOLE (METROGEL) 0.75 % (37.5mg/5 gram) vaginal gel      2. Uterovaginal prolapse        3. Cystocele, midline        4. Rectocele, female        5. Vaginal atrophy        6. Urinary incontinence, mixed        7. Pessary maintenance              We reviewed the above " "issues and discussed options for short-term versus long-term management of her problems.   Plan:   1) Stage 3-4 uterovaginal prolapse:  --continue 2 1/4 " GH pessary  --options: continue pessary use vs surgery              --if continue pessary use, use lubricants as below on regular basis                            --if surgery: consider [place] colpocleisis (closing vagina around uterus) if pathology from recent H/S D&C NEG; no longer concerned with intercourse                          --pap 2023 normal; no h/o abnormal paps              --if surgery: if wants to maintain ability to have intercourse, would do robotic-hysterectomy + removal of tubes/ovaries + sacral colpopexy              --if surgery:  bladder testing (urodynamics) to see if needs sling for stress incontinence                          --recent cysto 4-4-2024 with Orlando pendleton              --if surgery: need to complete evaluation to r/o GI contribution to bleeding--has GI appt June; rectal exam grossly NEG for blood today              --if surgery: follow up pathology from 4-4-2024 OR              --if surgery: clearance per PCP (Jamison) + labs (A1c, TSH, CBC, CMP, T&S)/EKG                          --had recent TTE/CT angio CA++, ok     2)  Chronic constipation:  --GI consult 6/2023  --continue to avoid straining with BMs  --continue linzess, colace, scoop of benefiber--take with large glass of water, not coffee  --how to slowly increase fiber:  Take 1 tsp of fiber powder (psyllium or other sugar-free powder).  Mix in 8 oz of water.  Take x 3-5 days.  Then, increase fiber by 1 tsp every 3-5 days until stool is easy to pass.  Stop and continue at that dose.   Do not exceed 6 tsps/day.  May also use over the counter stool softener 1-2 x/day.  AVOID laxatives.  --hydrate well  --consider pelvic floor PT to work on not straining as much with bowel movements     3)  Vaginal atrophy (dryness):    --use Non-estrogen options for vaginal dryness--will help " pessary not rub:  --Use REPLENS or REFRESH OTC: 1/2 applicator full in vagina twice a week.    --coconut oil: dime-sized amount with finger (as far as can reach internally) and around the vaginal opening and inner lips at least 3x/week     4)  Mixed urinary incontinence, urge > stress:    --Empty bladder every 3 hours.  Empty well: wait a minute, lean forward on toilet.    --Avoid dietary irritants (see sheet).  Keep diary x 3-5 days to determine your irritants.  --KEGELS: do 10 in AM and 10 in PM, holding each x 10 seconds.  When you feel urge to go, STOP, KEGEL, and when urge has passed, then go to bathroom.  Consider PT in future.    --URGE: consider medication in future. Takes 2-4 weeks to see if will have effect.  For dry mouth: get sour, sugar free lozenge or gum.  Does not want to try medications at this time  --STRESS:  Pessary vs. Sling.      5) vaginal discharge  --metrogel 1 applicator nightly x 5 days    6) RTC 3 months for PC     I spent a total of 20 minutes on the day of the visit.  This includes face to face time and non-face to face time preparing to see the patient (eg, review of tests), obtaining and/or reviewing separately obtained history, documenting clinical information in the electronic or other health record, independently interpreting results and communicating results to the patient/family/caregiver, or care coordinator.       Aleida Friedman, EMILE-BC  [facility]  Division of Female Pelvic Medicine and Reconstructive Surgery  Department of Obstetrics & Gynecology

## 2025-01-06 ENCOUNTER — TELEPHONE (OUTPATIENT)
Dept: GASTROENTEROLOGY | Facility: CLINIC | Age: 75
End: 2025-01-06
Payer: MEDICARE

## 2025-01-06 NOTE — TELEPHONE ENCOUNTER
Left a voicemail for patient regarding scheduling a clinic visit with Dr. Schulte. Provided [facility] clinic phone number.

## 2025-03-18 NOTE — PROGRESS NOTES
[facility] Consultation Note    Reason for Consult:  reflux and IBS    PCP:   Yohannes Swift       Referring MD:  No referring provider defined for this encounter.    HPI:  This is a 74 y.o. female here for evaluation of reflux and IBS. PMHx of HTN, Breast Cancer, DM. Previously followed with A. Has a history of alternating bowel habits and reflux. Was diagnosed with IBS in the past. Currently on Omeprazole 20mg in AM and two pepcids at night. Still having reflux symptoms despite this. She has pyrosis. Denied epigastric pain, N/V, dysphagia. Has some chronic cough and hoarseness of throat. She had CCY in 20s for cholelithiasis. She has her last endoscopy reports from [facility] with her. 1/2023 cscope removed 10mm sigmoid polyp and showed left sided diverticulosis. EGD showed 6/2023 gastritis and had biopsies of stomach, SB and esophagus which she was told was normal. She also had colon resection in 2005 for recurrant diverticulitis in sigmoid.      Predominately constipated in last 6 months. Has soft consistency of stools which are formed but they are hard to pass. She strains and also has to do manual maneuvers with gloves or use a suppository. No blood in stool. Previously more issues with diarrhea. When she is having diarrhea she will have some small episodes of fecal incontinence of liquid stool. She also has urinary incontinence. Does pelvic floor PT as referral from urogyn. Wears a pad. Has a pessery. Has 2 vaginal deliveries. Denied NSAID use. No Fh of CRC, gastric cancer, celiac or IBD. She has used linzess 145mcg in the past and this does work for her. Used to work immediately and now works later in the day. Also takes benefiber in the AM one dose and a stool softener.       Interval History 03/19/2025  Saw Dr. Barlow in the past but did not feel comfortable doing virtual visits forward  Feels like her reflux sometimes irritates her esophagus and she gets random coughing spells  She  "is currently taking famotidine per the chart  In the past she has been on Nexium and maybe Dexilant as well    Her bowels seem to be controlled by taking Linzess every few days  She does have a history of colonic resection due to diverticulitis    We did a review of some of her outside medications through the pharmacy reconciliation, and she has been on Elavil in the past likely for migraine prophylaxis  There is also mention of Cymbalta but she does not think it helped her  She is currently on a Triptan for headache prophylaxis    Objective Findings:    Vital Signs:  /74 (BP Location: Right arm, Patient Position: Sitting)   Pulse 74   Ht 5' 5" (1.651 m)   Wt 82.9 kg (182 lb 12.2 oz)   BMI 30.41 kg/m²   Body mass index is 30.41 kg/m².    Physical Exam:  General Appearance: Well appearing in no acute distress  Head:   Normocephalic, without obvious abnormality  Eyes:    No scleral icterus    Lungs: CTA bilaterally in anterior and posterior fields   Heart:  Regular rate and rhythm, no murmurs heard  Abdomen: Soft, non tender, non distended with positive bowel sounds in all four quadrants.      Imaging:  Reviewed     Endoscopy:    EGD 2023 with Dr Currie wnl    Assessment:    Reflux   Alternating bowel habits, predominately constipation      1. Chronic constipation        2. Rectocele, female        3. Gastroesophageal reflux disease without esophagitis            - Would like to try her back on a TCA for what I suspect to his esophageal hypersensitivity given her normal endoscopy and lack of hiatal hernia.  Side effects and warnings of this were thoroughly discussed and she will contact us in 1 month with an update.  If there is no benefit we can certainly go back to a PPI which she has been on past  - Cscope in 2026 for CRC surveillance   - we can refill her other medications including Linzess and famotidine when they are up for refill    RTC 4 months       Order summary:  Orders Placed This Encounter    " amitriptyline (ELAVIL) 10 MG tablet           Thank you so much for allowing me to participate in the care of Elizabeth Schulte MD  Gastroenterology   [facility]

## 2025-03-19 ENCOUNTER — OFFICE VISIT (OUTPATIENT)
Dept: GASTROENTEROLOGY | Facility: CLINIC | Age: 75
End: 2025-03-19
Payer: MEDICARE

## 2025-03-19 VITALS
HEART RATE: 74 BPM | SYSTOLIC BLOOD PRESSURE: 119 MMHG | WEIGHT: 182.75 LBS | DIASTOLIC BLOOD PRESSURE: 74 MMHG | BODY MASS INDEX: 30.45 KG/M2 | HEIGHT: 65 IN

## 2025-03-19 DIAGNOSIS — N81.6 RECTOCELE, FEMALE: ICD-10-CM

## 2025-03-19 DIAGNOSIS — K59.09 CHRONIC CONSTIPATION: Primary | ICD-10-CM

## 2025-03-19 DIAGNOSIS — K21.9 GASTROESOPHAGEAL REFLUX DISEASE WITHOUT ESOPHAGITIS: ICD-10-CM

## 2025-03-19 PROCEDURE — 99999 PR PBB SHADOW E&M-EST. PATIENT-LVL IV: CPT | Mod: PBBFAC,,, | Performed by: INTERNAL MEDICINE

## 2025-03-19 PROCEDURE — 1159F MED LIST DOCD IN RCRD: CPT | Mod: CPTII,S$GLB,, | Performed by: INTERNAL MEDICINE

## 2025-03-19 PROCEDURE — 1101F PT FALLS ASSESS-DOCD LE1/YR: CPT | Mod: CPTII,S$GLB,, | Performed by: INTERNAL MEDICINE

## 2025-03-19 PROCEDURE — 3074F SYST BP LT 130 MM HG: CPT | Mod: CPTII,S$GLB,, | Performed by: INTERNAL MEDICINE

## 2025-03-19 PROCEDURE — 3008F BODY MASS INDEX DOCD: CPT | Mod: CPTII,S$GLB,, | Performed by: INTERNAL MEDICINE

## 2025-03-19 PROCEDURE — 3078F DIAST BP <80 MM HG: CPT | Mod: CPTII,S$GLB,, | Performed by: INTERNAL MEDICINE

## 2025-03-19 PROCEDURE — 4010F ACE/ARB THERAPY RXD/TAKEN: CPT | Mod: CPTII,S$GLB,, | Performed by: INTERNAL MEDICINE

## 2025-03-19 PROCEDURE — 3288F FALL RISK ASSESSMENT DOCD: CPT | Mod: CPTII,S$GLB,, | Performed by: INTERNAL MEDICINE

## 2025-03-19 PROCEDURE — 99214 OFFICE O/P EST MOD 30 MIN: CPT | Mod: S$GLB,,, | Performed by: INTERNAL MEDICINE

## 2025-03-19 PROCEDURE — 1125F AMNT PAIN NOTED PAIN PRSNT: CPT | Mod: CPTII,S$GLB,, | Performed by: INTERNAL MEDICINE

## 2025-03-19 RX ORDER — AMITRIPTYLINE HYDROCHLORIDE 10 MG/1
10 TABLET, FILM COATED ORAL NIGHTLY
Qty: 30 TABLET | Refills: 3 | Status: SHIPPED | OUTPATIENT
Start: 2025-03-19

## 2025-03-24 ENCOUNTER — PATIENT MESSAGE (OUTPATIENT)
Dept: GASTROENTEROLOGY | Facility: CLINIC | Age: 75
End: 2025-03-24
Payer: MEDICARE

## 2025-03-24 RX ORDER — DESIPRAMINE HYDROCHLORIDE 10 MG/1
5 TABLET ORAL NIGHTLY
Qty: 15 TABLET | Refills: 5 | Status: CANCELLED | OUTPATIENT
Start: 2025-03-24 | End: 2026-03-24

## 2025-04-04 ENCOUNTER — TELEPHONE (OUTPATIENT)
Dept: RHEUMATOLOGY | Facility: CLINIC | Age: 75
End: 2025-04-04
Payer: MEDICARE

## 2025-07-14 ENCOUNTER — OFFICE VISIT (OUTPATIENT)
Facility: CLINIC | Age: 75
End: 2025-07-14
Payer: MEDICARE

## 2025-07-14 VITALS
BODY MASS INDEX: 29.94 KG/M2 | WEIGHT: 179.69 LBS | DIASTOLIC BLOOD PRESSURE: 69 MMHG | HEIGHT: 65 IN | HEART RATE: 76 BPM | SYSTOLIC BLOOD PRESSURE: 122 MMHG

## 2025-07-14 DIAGNOSIS — N81.6 RECTOCELE, FEMALE: ICD-10-CM

## 2025-07-14 DIAGNOSIS — K21.9 GASTROESOPHAGEAL REFLUX DISEASE WITHOUT ESOPHAGITIS: ICD-10-CM

## 2025-07-14 DIAGNOSIS — K59.09 CHRONIC CONSTIPATION: Primary | ICD-10-CM

## 2025-07-14 PROCEDURE — 3078F DIAST BP <80 MM HG: CPT | Mod: CPTII,S$GLB,, | Performed by: INTERNAL MEDICINE

## 2025-07-14 PROCEDURE — 99214 OFFICE O/P EST MOD 30 MIN: CPT | Mod: S$GLB,,, | Performed by: INTERNAL MEDICINE

## 2025-07-14 PROCEDURE — 1101F PT FALLS ASSESS-DOCD LE1/YR: CPT | Mod: CPTII,S$GLB,, | Performed by: INTERNAL MEDICINE

## 2025-07-14 PROCEDURE — 4010F ACE/ARB THERAPY RXD/TAKEN: CPT | Mod: CPTII,S$GLB,, | Performed by: INTERNAL MEDICINE

## 2025-07-14 PROCEDURE — 3008F BODY MASS INDEX DOCD: CPT | Mod: CPTII,S$GLB,, | Performed by: INTERNAL MEDICINE

## 2025-07-14 PROCEDURE — 1126F AMNT PAIN NOTED NONE PRSNT: CPT | Mod: CPTII,S$GLB,, | Performed by: INTERNAL MEDICINE

## 2025-07-14 PROCEDURE — 3288F FALL RISK ASSESSMENT DOCD: CPT | Mod: CPTII,S$GLB,, | Performed by: INTERNAL MEDICINE

## 2025-07-14 PROCEDURE — 1159F MED LIST DOCD IN RCRD: CPT | Mod: CPTII,S$GLB,, | Performed by: INTERNAL MEDICINE

## 2025-07-14 PROCEDURE — 3074F SYST BP LT 130 MM HG: CPT | Mod: CPTII,S$GLB,, | Performed by: INTERNAL MEDICINE

## 2025-07-14 PROCEDURE — 99999 PR PBB SHADOW E&M-EST. PATIENT-LVL IV: CPT | Mod: PBBFAC,,, | Performed by: INTERNAL MEDICINE

## 2025-07-14 RX ORDER — FAMOTIDINE 20 MG/1
20 TABLET, FILM COATED ORAL 2 TIMES DAILY
Qty: 60 TABLET | Refills: 11 | Status: SHIPPED | OUTPATIENT
Start: 2025-07-14

## 2025-07-14 NOTE — PROGRESS NOTES
[facility] Consultation Note    Reason for Consult:  reflux and IBS    PCP:   Yohannes Swift       Referring MD:  No referring provider defined for this encounter.    HPI:  This is a 75 y.o. female here for evaluation of reflux and IBS. PMHx of HTN, Breast Cancer, DM. Previously followed with A. Has a history of alternating bowel habits and reflux. Was diagnosed with IBS in the past. Currently on Omeprazole 20mg in AM and two pepcids at night. Still having reflux symptoms despite this. She has pyrosis. Denied epigastric pain, N/V, dysphagia. Has some chronic cough and hoarseness of throat. She had CCY in 20s for cholelithiasis. She has her last endoscopy reports from [facility] with her. 1/2023 cscope removed 10mm sigmoid polyp and showed left sided diverticulosis. EGD showed 6/2023 gastritis and had biopsies of stomach, SB and esophagus which she was told was normal. She also had colon resection in 2005 for recurrant diverticulitis in sigmoid.      Predominately constipated in last 6 months. Has soft consistency of stools which are formed but they are hard to pass. She strains and also has to do manual maneuvers with gloves or use a suppository. No blood in stool. Previously more issues with diarrhea. When she is having diarrhea she will have some small episodes of fecal incontinence of liquid stool. She also has urinary incontinence. Does pelvic floor PT as referral from urogyn. Wears a pad. Has a pessery. Has 2 vaginal deliveries. Denied NSAID use. No Fh of CRC, gastric cancer, celiac or IBD. She has used linzess 145mcg in the past and this does work for her. Used to work immediately and now works later in the day. Also takes benefiber in the AM one dose and a stool softener.       Interval History 07/14/2025    History of Present Illness    CHIEF COMPLAINT:  Patient presents today for follow up    GERD:  She reports ongoing management of acid reflux with Famotidine 20 mg twice daily with  minimal perceived effectiveness and concern about potential bone health impacts. She has a known history of hiatal hernia diagnosed in 2014 located in the cardia region of the stomach. She experiences increasing hoarseness attributed to her acid reflux condition. She acknowledges dietary influences on symptoms and notes challenges with maintaining a reflux-friendly diet due to her 's food choices. She currently does not feel the Famotidine is adequately controlling her symptoms.    FIBROMYALGIA:  She reports ongoing fibromyalgia with very severe pain levels impacting daily functioning. She is currently taking Venlafaxine for pain management and continues to seek alternative pain management strategies.    GASTROINTESTINAL:  She uses Miralax daily for managing bowel movements, which she finds effective in maintaining regularity. She uses Linzess as a backup medication when dietary protein intake potentially disrupts her bowel function, though she has not needed to use Linzess recently. She has a history of diverticular surgery. Her GI symptoms are currently stable.    MEDICATIONS:  She recently switched from Celecoxib back to Duloxetine after experiencing difficulties with eating control and is currently taking Duloxetine.    DIET:  She reports ongoing challenges with dietary control and difficulty maintaining a consistent diet, particularly when her  introduces unhealthy food options at home. Recent medication changes impacted her eating habits, leading to increased struggles with food control. However, since switching back to her previous medication regimen, she feels her eating control has somewhat stabilized.      ROS:  General: -fever, -chills, -fatigue, -weight gain, -weight loss  Eyes: -vision changes, -redness, -discharge  ENT: -ear pain, -nasal congestion, -sore throat, +hoarseness  Cardiovascular: -chest pain, -palpitations, -lower extremity edema  Respiratory: -cough, -shortness of  "breath  Gastrointestinal: +abdominal pain, -nausea, -vomiting, -diarrhea, +constipation, -blood in stool, +change in bowel habits  Genitourinary: -dysuria, -hematuria, -frequency  Musculoskeletal: -joint pain, +muscle pain, +body aches  Skin: -rash, -lesion  Neurological: -headache, -dizziness, -numbness, -tingling  Psychiatric: -anxiety, -depression, -sleep difficulty           Objective Findings:    Vital Signs:  /69 (Patient Position: Sitting)   Pulse 76   Ht 5' 5" (1.651 m)   Wt 81.5 kg (179 lb 10.8 oz)   BMI 29.90 kg/m²   Body mass index is 29.9 kg/m².    Physical Exam:  General Appearance: Well appearing in no acute distress  Head:   Normocephalic, without obvious abnormality  Eyes:    No scleral icterus    Lungs: CTA bilaterally in anterior and posterior fields   Heart:  Regular rate and rhythm, no murmurs heard  Abdomen: Soft, non tender, non distended with positive bowel sounds in all four quadrants.      Imaging:  Reviewed     Endoscopy:    EGD 2023 with Dr Currie wn    Assessment:  1. Chronic constipation        2. Rectocele, female        3. Gastroesophageal reflux disease without esophagitis          Assessment & Plan    K21.9 Gastro-esophageal reflux disease without esophagitis  K44.9 Diaphragmatic hernia without obstruction or gangrene  M79.7 Fibromyalgia  G89.29 Other chronic pain  R49.0 Dysphonia  R10.819 Abdominal tenderness, unspecified site  K59.00 Constipation, unspecified  K91.89 Other postprocedural complications and disorders of digestive system    GASTROESOPHAGEAL REFLUX DISEASE (GERD):  - Assessed acid reflux symptoms, current medication regimen, and effectiveness of famotidine for symptom control.  - Continue famotidine twice daily.  - Explained potential association between long-term acid reflux medication use and osteoporosis risk, particularly in smokers and those with low vitamin D levels.    DIAPHRAGMATIC HERNIA:  - Reviewed history of hiatal hernia, noting discrepancy " between 2014 and 2023 imaging results, and clarified location in relation to stomach anatomy.    CONSTIPATION:  - Assessed bowel regularity and current management with Miralax.    ABDOMINAL TENDERNESS:  - Evaluated abdominal tenderness, considering possible relation to previous diverticular surgery and current bowel habits, and discussed possibility of scar tissue affecting bowel function.    POST-PROCEDURAL COMPLICATIONS OF DIGESTIVE SYSTEM:  - Explained limitations of visualizing external scar tissue during colonoscopy.    FOLLOW-UP:  - Contact the office if any medications are running out.  - Follow up for combined upper endoscopy and colonoscopy next year.           RTC 6 months       Order summary:  Orders Placed This Encounter    famotidine (PEPCID) 20 MG tablet             Thank you so much for allowing me to participate in the care of Elizabeth Schulte MD  Gastroenterology   [facility]

## 2025-08-14 ENCOUNTER — TELEPHONE (OUTPATIENT)
Dept: HEMATOLOGY/ONCOLOGY | Facility: CLINIC | Age: 75
End: 2025-08-14
Payer: MEDICARE